# Patient Record
Sex: FEMALE | Race: BLACK OR AFRICAN AMERICAN
[De-identification: names, ages, dates, MRNs, and addresses within clinical notes are randomized per-mention and may not be internally consistent; named-entity substitution may affect disease eponyms.]

---

## 2016-10-26 VITALS — SYSTOLIC BLOOD PRESSURE: 133 MMHG | DIASTOLIC BLOOD PRESSURE: 63 MMHG

## 2017-10-09 ENCOUNTER — HOSPITAL ENCOUNTER (OUTPATIENT)
Dept: HOSPITAL 61 - MAMMO | Age: 77
Discharge: HOME | End: 2017-10-09
Attending: OBSTETRICS & GYNECOLOGY
Payer: MEDICARE

## 2017-10-09 DIAGNOSIS — Z12.31: Primary | ICD-10-CM

## 2017-10-09 NOTE — RAD
DATE: 10/9/2017



EXAM: DIGITAL SCREEN BILAT W/CAD



HISTORY: Screening



COMPARISON: One year earlier



This study was interpreted with the benefit of Computerized Aided Detection

(CAD).



FINDINGS:



Breast Density: FATTY  The Breast Parenchyma is primarily fatty replaced.

Breast parenchyma level density A.. 



 On the conventional images there is a density seen laterally in the right

breast. Additional images were obtained and no definite mass is seen although

the density does persist on the rolled medial image. It probably reflects

summation artifact.



Ultrasound was performed. The examination was targeted to the periareolar

position and the lateral aspect of the breast. A definite mass laterally in

the breast, corresponding to the density seen on the mammogram (again probably

reflecting summation artifact) s not seen. There is a triangular hypoechoic

area, nonvascular, at the 12:30 position of the breast 3 cm from the nipple

which is probably benign. Follow-up mammography of the right breast and

targeted ultrasound is suggested in 4 months to confirm stability 







IMPRESSION: Probable benign findings in the right breast as outlined above.

Follow-up mammography and targeted ultrasound in 3-4 months advised







BI-RADS CATEGORY: 3 PROBABLE BENIGN FINDING(S-SHORT INTERVAL FOLLOW-UP

SUGGESTED



RECOMMENDED FOLLOW-UP: 3M 3 MONTH FOLLOW-UP



PQRS compliance statement: Patient information was entered into a reminder

system with a target due date 2/9/2018 for the next mammogram.



Mammography is a sensitive method for finding small breast cancers, but it

does not detect them all and is not a substitute for careful clinical

examination.  A negative mammogram does not negate a clinically suspicious

finding and should not result in delay in biopsying a clinically suspicious

abnormality.



"Our facility is accredited by the American [employer] Mammography

Program."

## 2018-03-12 ENCOUNTER — HOSPITAL ENCOUNTER (EMERGENCY)
Dept: HOSPITAL 61 - ER | Age: 78
Discharge: HOME | End: 2018-03-12
Payer: MEDICARE

## 2018-03-12 VITALS
SYSTOLIC BLOOD PRESSURE: 170 MMHG | DIASTOLIC BLOOD PRESSURE: 76 MMHG | SYSTOLIC BLOOD PRESSURE: 170 MMHG | DIASTOLIC BLOOD PRESSURE: 76 MMHG

## 2018-03-12 DIAGNOSIS — I65.21: ICD-10-CM

## 2018-03-12 DIAGNOSIS — I25.10: ICD-10-CM

## 2018-03-12 DIAGNOSIS — Z88.8: ICD-10-CM

## 2018-03-12 DIAGNOSIS — Z90.49: ICD-10-CM

## 2018-03-12 DIAGNOSIS — E11.9: ICD-10-CM

## 2018-03-12 DIAGNOSIS — G45.0: ICD-10-CM

## 2018-03-12 DIAGNOSIS — E78.00: ICD-10-CM

## 2018-03-12 DIAGNOSIS — Z88.1: ICD-10-CM

## 2018-03-12 DIAGNOSIS — Z88.0: ICD-10-CM

## 2018-03-12 DIAGNOSIS — R42: Primary | ICD-10-CM

## 2018-03-12 DIAGNOSIS — I10: ICD-10-CM

## 2018-03-12 DIAGNOSIS — Z90.710: ICD-10-CM

## 2018-03-12 LAB
ADD MAN DIFF?: NO
ALBUMIN SERPL-MCNC: 3.6 G/DL (ref 3.4–5)
ALBUMIN/GLOB SERPL: 0.9 {RATIO} (ref 1–1.7)
ALP SERPL-CCNC: 53 U/L (ref 46–116)
ALT (SGPT): 27 U/L (ref 14–59)
ANION GAP SERPL CALC-SCNC: 10 MMOL/L (ref 6–14)
AST SERPL-CCNC: 27 U/L (ref 15–37)
BASO #: 0.1 X10^3/UL (ref 0–0.2)
BASO %: 1 % (ref 0–3)
BLOOD UREA NITROGEN: 29 MG/DL (ref 7–20)
BUN/CREAT SERPL: 26 (ref 6–20)
CALCIUM: 9.5 MG/DL (ref 8.5–10.1)
CHLORIDE: 104 MMOL/L (ref 98–107)
CO2 SERPL-SCNC: 28 MMOL/L (ref 21–32)
CREAT SERPL-MCNC: 1.1 MG/DL (ref 0.6–1)
EOS #: 0.2 X10^3/UL (ref 0–0.7)
EOS %: 2 % (ref 0–3)
GFR SERPLBLD BASED ON 1.73 SQ M-ARVRAT: 58.3 ML/MIN
GLOBULIN SER-MCNC: 3.9 G/DL (ref 2.2–3.8)
GLUCOSE SERPL-MCNC: 138 MG/DL (ref 70–99)
HCG SERPL-ACNC: 9.2 X10^3/UL (ref 4–11)
HEMATOCRIT: 37.4 % (ref 36–47)
HEMOGLOBIN: 12.5 G/DL (ref 12–15.5)
LYMPH #: 2.2 X10^3/UL (ref 1–4.8)
LYMPH %: 24 % (ref 24–48)
MEAN CORPUSCULAR HEMOGLOBIN: 28 PG (ref 25–35)
MEAN CORPUSCULAR HGB CONC: 33 G/DL (ref 31–37)
MEAN CORPUSCULAR VOLUME: 85 FL (ref 79–100)
MONO #: 0.6 X10^3/UL (ref 0–1.1)
MONO %: 7 % (ref 0–9)
NEUT #: 6.2 X10^3UL (ref 1.8–7.7)
NEUT %: 67 % (ref 31–73)
PLATELET COUNT: 398 X10^3/UL (ref 140–400)
POTASSIUM SERPL-SCNC: 3.8 MMOL/L (ref 3.5–5.1)
RED BLOOD COUNT: 4.42 X10^6/UL (ref 3.5–5.4)
RED CELL DISTRIBUTION WIDTH: 14.7 % (ref 11.5–14.5)
SODIUM: 142 MMOL/L (ref 136–145)
TOTAL BILIRUBIN: 0.3 MG/DL (ref 0.2–1)
TOTAL PROTEIN: 7.5 G/DL (ref 6.4–8.2)
TROPONINI: < 0.017 NG/ML (ref 0–0.06)

## 2018-03-12 PROCEDURE — 71045 X-RAY EXAM CHEST 1 VIEW: CPT

## 2018-03-12 PROCEDURE — 80053 COMPREHEN METABOLIC PANEL: CPT

## 2018-03-12 PROCEDURE — 70498 CT ANGIOGRAPHY NECK: CPT

## 2018-03-12 PROCEDURE — 96374 THER/PROPH/DIAG INJ IV PUSH: CPT

## 2018-03-12 PROCEDURE — 93005 ELECTROCARDIOGRAM TRACING: CPT

## 2018-03-12 PROCEDURE — 84484 ASSAY OF TROPONIN QUANT: CPT

## 2018-03-12 PROCEDURE — 36415 COLL VENOUS BLD VENIPUNCTURE: CPT

## 2018-03-12 PROCEDURE — 70496 CT ANGIOGRAPHY HEAD: CPT

## 2018-03-12 PROCEDURE — 85025 COMPLETE CBC W/AUTO DIFF WBC: CPT

## 2018-03-12 PROCEDURE — 99285 EMERGENCY DEPT VISIT HI MDM: CPT

## 2018-03-12 PROCEDURE — 96361 HYDRATE IV INFUSION ADD-ON: CPT

## 2018-03-12 RX ADMIN — BACITRACIN 1 MLS/HR: 5000 INJECTION, POWDER, FOR SOLUTION INTRAMUSCULAR at 12:01

## 2018-03-12 RX ADMIN — ONDANSETRON 1 MG: 2 INJECTION INTRAMUSCULAR; INTRAVENOUS at 12:01

## 2018-03-12 RX ADMIN — MECLIZINE 1 MG: 12.5 TABLET ORAL at 12:01

## 2018-03-12 RX ADMIN — IOHEXOL 1 ML: 300 INJECTION, SOLUTION INTRAVENOUS at 12:50

## 2018-03-21 ENCOUNTER — HOSPITAL ENCOUNTER (OUTPATIENT)
Dept: HOSPITAL 61 - ECHO | Age: 78
Discharge: HOME | End: 2018-03-21
Attending: INTERNAL MEDICINE
Payer: MEDICARE

## 2018-03-21 DIAGNOSIS — I51.7: ICD-10-CM

## 2018-03-21 DIAGNOSIS — I25.10: Primary | ICD-10-CM

## 2018-03-21 PROCEDURE — 93306 TTE W/DOPPLER COMPLETE: CPT

## 2018-10-10 ENCOUNTER — HOSPITAL ENCOUNTER (OUTPATIENT)
Dept: HOSPITAL 61 - MAMMO | Age: 78
Discharge: HOME | End: 2018-10-10
Attending: OBSTETRICS & GYNECOLOGY
Payer: MEDICARE

## 2018-10-10 DIAGNOSIS — Z12.31: Primary | ICD-10-CM

## 2018-10-10 PROCEDURE — 77067 SCR MAMMO BI INCL CAD: CPT

## 2018-10-10 PROCEDURE — 77063 BREAST TOMOSYNTHESIS BI: CPT

## 2018-10-10 NOTE — RAD
DATE: 10/10/2018



EXAM: MAMMO RACHEL SCREENING BILATERAL



HISTORY: Routine screening



COMPARISON: 10/9/2017



This study was interpreted with the benefit of Computerized Aided Detection

(CAD).





Breast Density: FATTY The breast parenchyma is primarily fatty replaced.

Breast parenchyma level density A.





FINDINGS: 2-D and 3-D tomosynthesis imaging was performed in CC and MLO

projections.  Several tiny smooth nodules are present in both breasts.  These

are better demonstrated on the current 3-D views compared to the previous 2-D

studies.  Multiple smooth nodule such as this tend to be benign.  No

spiculated mass or architectural distortion is evident.  Benign type

calcifications are present.  No suspicious microcalcifications have developed.





IMPRESSION: There is no mammographic evidence of malignancy in either breast.







BI-RADS CATEGORY: 2 BENIGN FINDING(S)



RECOMMENDED FOLLOW-UP: 12M 12 MONTH FOLLOW-UP



PQRS compliance statement: Patient information was entered into a reminder

system with a target due date     for the next mammogram.



Mammography is a sensitive method for finding small breast cancers, but it

does not detect them all and is not a substitute for careful clinical

examination.  A negative mammogram does not negate a clinically suspicious

finding and should not result in delay in biopsying a clinically suspicious

abnormality.



"Our facility is accredited by the American [employer] Mammography

Program."

## 2018-11-30 ENCOUNTER — HOSPITAL ENCOUNTER (OUTPATIENT)
Dept: HOSPITAL 61 - NM | Age: 78
Discharge: HOME | End: 2018-11-30
Attending: INTERNAL MEDICINE
Payer: MEDICARE

## 2018-11-30 DIAGNOSIS — R00.2: ICD-10-CM

## 2018-11-30 DIAGNOSIS — I10: ICD-10-CM

## 2018-11-30 DIAGNOSIS — E11.9: ICD-10-CM

## 2018-11-30 DIAGNOSIS — I25.10: Primary | ICD-10-CM

## 2018-11-30 DIAGNOSIS — E66.9: ICD-10-CM

## 2018-11-30 DIAGNOSIS — R94.31: ICD-10-CM

## 2018-11-30 PROCEDURE — 96375 TX/PRO/DX INJ NEW DRUG ADDON: CPT

## 2018-11-30 PROCEDURE — 96374 THER/PROPH/DIAG INJ IV PUSH: CPT

## 2018-11-30 PROCEDURE — 96376 TX/PRO/DX INJ SAME DRUG ADON: CPT

## 2018-11-30 PROCEDURE — A9500 TC99M SESTAMIBI: HCPCS

## 2018-11-30 PROCEDURE — 93017 CV STRESS TEST TRACING ONLY: CPT

## 2018-11-30 PROCEDURE — 78452 HT MUSCLE IMAGE SPECT MULT: CPT

## 2018-11-30 NOTE — RAD
MR#: [id number]

Account#: [id number]

Accession#: [id number]

Date of Study: 11/30/2018

Ordering Physician: ROSE WESTON, 

Referring Physician: SURAJ TORRES Tech: BHAVYA Frazier, ARRT (R) (N)





--------------- APPROVED REPORT --------------





Test Type:          Pharmacological

Stress Nurse/Tech: Alcira Gabriel R.N.

Test Indications: CAD, palpatations, SOB

Cardiac History: obese, htn, heart cath- no stents, DM

Medications:     See Electronic Medical Record

Medical History: See Electronic Medical Record

Resting Heart Rate: 76 bpm

Resting Blood Pressure: 137/84mmHg

Pretest Chest Pain: No chest pain



Nurse/Tech Notes

S1S2, lungs CTA

Consent: The procedure was explained to the patient in lay terms. Informed consent was witnessed. Saji
eout was entered into [employer]. History and Stress Test performed by Andria Forrest,  (R) (N)



Pharm. Details

Pharmacologic stress testing was performed using 0.4mg per 5ml of regadenoson given intravenously ove
r 7-10 seconds.



Stress Symptoms

slight SOB, a very weird feeling, severe h/a at first then deminished to just minimally noticed. Pt's
 b/p became very elevated post lexiscan and stayed elevated through the end of recovery period. had p
t take her home b/p meds( takes 3) and will recheck b/p when she comes back for after stress images. 
Notified Noreen CANNON of these issues. will update him after next VS taken. 1245 b/p rechecked 1 hour a
fter she took her home b/p medications.b/p is now 187/73. notified Dr. Weston



POST EXERCISE

Reason for Termination: Infusion complete

Max HR: 108 bpm

Max Blood Pressure: 219/78mmHg

Blood Pressure response to exercise: Abnormal increase in blood pressure during stress.

Heart Rate response to exercise: wnl

Chest Pain: No. 

Arrhythmia: No. 



INTERPRETATION

Stress EKG Conclusion: The resting EKG shows a sinus rhythm and diffuse nonspecific ST-T wave changes
.

The stress EKG showed further nonspecific ST-T wave changes.

Baseline abnormal EKG with nonspecific ST-T wave changes, these changes became more prominent with st
ress. Suggestive but not diagnostic of ischemia.



Imaging Protocol

IMAGE PROTOCOL: Rest Tc-99m/stress Tc-99m 1 day



Rest:            Stress:         Viability:   

Radiopharm.Tc99m [id number] Sestamibi

Dose11.1mCi            35.2mCi            

Img Date  11/30/2018 11/30/2018      

Inj-Img [id number].           60min.           



Rest Admin Site:IV - Left AntecubitalAdministrator:RT Marcia (R)(N)

Stress Admin Site: IV - Left AntecubitalAdministrator: RT Marcia (R)(N)



STRESS DATA

End Diast. Vol.60.0mlLVEDV index BSA32.0ml

End Syst. Vol.10.0mlLVESV index BSA5.0ml

Myocardial Kghm850.0gEject. Vfgevjdd59.0%



Stress Scores

Regional WT1.00Summed WT7.00

Regional WM0.00Summed WM0.00



LV Perfusion

The stress scans show inferior wall thinning.

The rest scans show inferior wall thinning.

Nuclear imaging shows no reversible ischemia.

Nuclear imaging shows fixed mild inferior wall thinning which in the setting of normal LV systolic fu
nction is consistent with an attenuation defect.



Wall Motion

Left ventricular systolic function is normal with an ejection fraction of greater than 70%.



LV Perf. Quant

17 Seg. SSS4.00

17 Seg. SRS6.00

17 Seg. SDS0.00

Stress Defect Extent (% LAD)1.30Rest Defect Extent (% LAD)0.00Rev. Defect Extent (% LAD)0.00

Stress Defect Extent (% LCX) 0.00Rest Defect Extent (% LCX)1.30Rev. Defect Extent (% LCX)0.00

Stress Defect Extent (% RCA)0.00Rest Defect Extent (% RCA)24.40Rev. Defect Extent (% RCA)0.00

Stress Defect Extent (% BE)1.30Rest Defect Extent (% BE)7.80Rev. Defect Extent (% BE)0.00



Conclusion

1. Abnormal baseline EKG with nonspecific ST-T wave changes which become more prominent with stress. 
This is suggestive but not diagnostic of ischemia.

2. Nuclear imaging shows no reversible ischemia.

3. Nuclear imaging shows fixed mild inferior wall thinning which is consistent with an attenuation de
fect.

4. Normal left ventricular systolic function with no regional wall motion abnormalities and an ejecti
on fraction of greater than 70%.

5. Moderately low risk Lexiscan nuclear stress test with no reversible ischemia and normal LV systoli
c function.



Signed by : Pradeep Diaz MD

Electronically Approved : 11/30/2018 12:58:06

## 2019-02-25 ENCOUNTER — HOSPITAL ENCOUNTER (OUTPATIENT)
Dept: HOSPITAL 35 - CATH | Age: 79
Setting detail: OBSERVATION
LOS: 1 days | Discharge: HOME | End: 2019-02-26
Attending: INTERNAL MEDICINE | Admitting: INTERNAL MEDICINE
Payer: COMMERCIAL

## 2019-02-25 VITALS — DIASTOLIC BLOOD PRESSURE: 80 MMHG | SYSTOLIC BLOOD PRESSURE: 201 MMHG

## 2019-02-25 VITALS — SYSTOLIC BLOOD PRESSURE: 184 MMHG | DIASTOLIC BLOOD PRESSURE: 55 MMHG

## 2019-02-25 VITALS — BODY MASS INDEX: 32.9 KG/M2 | WEIGHT: 185.7 LBS | HEIGHT: 63 IN

## 2019-02-25 DIAGNOSIS — I25.10: ICD-10-CM

## 2019-02-25 DIAGNOSIS — Z88.0: ICD-10-CM

## 2019-02-25 DIAGNOSIS — E11.51: ICD-10-CM

## 2019-02-25 DIAGNOSIS — I47.1: Primary | ICD-10-CM

## 2019-02-25 DIAGNOSIS — I10: ICD-10-CM

## 2019-02-25 DIAGNOSIS — E78.5: ICD-10-CM

## 2019-02-25 LAB
ALBUMIN SERPL-MCNC: 4.1 G/DL (ref 3.4–5)
ALT SERPL-CCNC: 24 U/L (ref 30–65)
ANION GAP SERPL CALC-SCNC: 11 MMOL/L (ref 7–16)
APTT BLD: 25.4 SECONDS (ref 24.5–32.8)
AST SERPL-CCNC: 24 U/L (ref 15–37)
BASOPHILS NFR BLD AUTO: 0.9 % (ref 0–2)
BILIRUB SERPL-MCNC: 0.4 MG/DL
BUN SERPL-MCNC: 28 MG/DL (ref 7–18)
CALCIUM SERPL-MCNC: 10.3 MG/DL (ref 8.5–10.1)
CHLORIDE SERPL-SCNC: 102 MMOL/L (ref 98–107)
CO2 SERPL-SCNC: 31 MMOL/L (ref 21–32)
CREAT SERPL-MCNC: 1.1 MG/DL (ref 0.6–1)
EOSINOPHIL NFR BLD: 1.7 % (ref 0–3)
ERYTHROCYTE [DISTWIDTH] IN BLOOD BY AUTOMATED COUNT: 15.5 % (ref 10.5–14.5)
GLUCOSE SERPL-MCNC: 153 MG/DL (ref 74–106)
GRANULOCYTES NFR BLD MANUAL: 61.3 % (ref 36–66)
HCT VFR BLD CALC: 41.4 % (ref 37–47)
HGB BLD-MCNC: 13.4 GM/DL (ref 12–15)
INR PPP: 1
LYMPHOCYTES NFR BLD AUTO: 28.4 % (ref 24–44)
MCH RBC QN AUTO: 27 PG (ref 26–34)
MCHC RBC AUTO-ENTMCNC: 32.4 G/DL (ref 28–37)
MCV RBC: 83.4 FL (ref 80–100)
MONOCYTES NFR BLD: 7.7 % (ref 1–8)
NEUTROPHILS # BLD: 6.5 THOU/UL (ref 1.4–8.2)
PLATELET # BLD: 389 THOU/UL (ref 150–400)
POTASSIUM SERPL-SCNC: 3.1 MMOL/L (ref 3.5–5.1)
PROT SERPL-MCNC: 8.4 G/DL (ref 6.4–8.2)
PROTHROMBIN TIME: 10.1 SECONDS (ref 9.3–11.4)
RBC # BLD AUTO: 4.96 MIL/UL (ref 4.2–5)
SODIUM SERPL-SCNC: 144 MMOL/L (ref 136–145)
WBC # BLD AUTO: 10.6 THOU/UL (ref 4–11)

## 2019-02-25 PROCEDURE — 70005: CPT

## 2019-02-25 PROCEDURE — 62110: CPT

## 2019-02-25 PROCEDURE — 62900: CPT

## 2019-02-25 NOTE — NUR
PT ADMITTED FROM CATH LAB AT APPROX 1545. PT ORIENTED TO ROOM AND UNIT. PT A/O
X 4. DENIES PAIN. VSS. SR ON TELE. ADMISSION ASSESSMENT COMPLETED.

## 2019-02-26 ENCOUNTER — HOSPITAL ENCOUNTER (INPATIENT)
Dept: HOSPITAL 61 - ER | Age: 79
LOS: 3 days | Discharge: HOME | DRG: 305 | End: 2019-03-01
Attending: FAMILY MEDICINE | Admitting: FAMILY MEDICINE
Payer: MEDICARE

## 2019-02-26 VITALS — SYSTOLIC BLOOD PRESSURE: 161 MMHG | DIASTOLIC BLOOD PRESSURE: 70 MMHG

## 2019-02-26 VITALS — BODY MASS INDEX: 33.69 KG/M2 | WEIGHT: 190.13 LBS | HEIGHT: 63 IN

## 2019-02-26 VITALS — DIASTOLIC BLOOD PRESSURE: 82 MMHG | SYSTOLIC BLOOD PRESSURE: 145 MMHG

## 2019-02-26 VITALS — DIASTOLIC BLOOD PRESSURE: 82 MMHG | SYSTOLIC BLOOD PRESSURE: 151 MMHG

## 2019-02-26 DIAGNOSIS — B37.9: ICD-10-CM

## 2019-02-26 DIAGNOSIS — Z90.710: ICD-10-CM

## 2019-02-26 DIAGNOSIS — T78.3XXA: ICD-10-CM

## 2019-02-26 DIAGNOSIS — E78.5: ICD-10-CM

## 2019-02-26 DIAGNOSIS — I25.10: ICD-10-CM

## 2019-02-26 DIAGNOSIS — M19.90: ICD-10-CM

## 2019-02-26 DIAGNOSIS — Z88.0: ICD-10-CM

## 2019-02-26 DIAGNOSIS — E11.22: ICD-10-CM

## 2019-02-26 DIAGNOSIS — I16.0: Primary | ICD-10-CM

## 2019-02-26 DIAGNOSIS — J45.909: ICD-10-CM

## 2019-02-26 DIAGNOSIS — Z90.49: ICD-10-CM

## 2019-02-26 DIAGNOSIS — I15.8: ICD-10-CM

## 2019-02-26 DIAGNOSIS — Z88.1: ICD-10-CM

## 2019-02-26 DIAGNOSIS — E78.00: ICD-10-CM

## 2019-02-26 DIAGNOSIS — N18.9: ICD-10-CM

## 2019-02-26 DIAGNOSIS — Z82.49: ICD-10-CM

## 2019-02-26 DIAGNOSIS — T46.4X5A: ICD-10-CM

## 2019-02-26 DIAGNOSIS — Z88.8: ICD-10-CM

## 2019-02-26 LAB
ALBUMIN SERPL-MCNC: 3.5 G/DL (ref 3.4–5)
ALBUMIN/GLOB SERPL: 0.9 {RATIO} (ref 1–1.7)
ALP SERPL-CCNC: 51 U/L (ref 46–116)
ALT SERPL-CCNC: 20 U/L (ref 14–59)
ANION GAP SERPL CALC-SCNC: 9 MMOL/L (ref 6–14)
AST SERPL-CCNC: 27 U/L (ref 15–37)
BASOPHILS # BLD AUTO: 0 X10^3/UL (ref 0–0.2)
BASOPHILS NFR BLD: 0 % (ref 0–3)
BILIRUB SERPL-MCNC: 0.4 MG/DL (ref 0.2–1)
BUN SERPL-MCNC: 24 MG/DL (ref 7–20)
BUN/CREAT SERPL: 24 (ref 6–20)
CALCIUM SERPL-MCNC: 9.3 MG/DL (ref 8.5–10.1)
CHLORIDE SERPL-SCNC: 102 MMOL/L (ref 98–107)
CO2 SERPL-SCNC: 32 MMOL/L (ref 21–32)
CREAT SERPL-MCNC: 1 MG/DL (ref 0.6–1)
EOSINOPHIL NFR BLD: 0.2 X10^3/UL (ref 0–0.7)
EOSINOPHIL NFR BLD: 2 % (ref 0–3)
ERYTHROCYTE [DISTWIDTH] IN BLOOD BY AUTOMATED COUNT: 15.3 % (ref 11.5–14.5)
GFR SERPLBLD BASED ON 1.73 SQ M-ARVRAT: 64.9 ML/MIN
GLOBULIN SER-MCNC: 4 G/DL (ref 2.2–3.8)
GLUCOSE SERPL-MCNC: 118 MG/DL (ref 70–99)
HCT VFR BLD CALC: 39 % (ref 36–47)
HGB BLD-MCNC: 12.7 G/DL (ref 12–15.5)
LYMPHOCYTES # BLD: 2.9 X10^3/UL (ref 1–4.8)
LYMPHOCYTES NFR BLD AUTO: 26 % (ref 24–48)
MCH RBC QN AUTO: 27 PG (ref 25–35)
MCHC RBC AUTO-ENTMCNC: 33 G/DL (ref 31–37)
MCV RBC AUTO: 83 FL (ref 79–100)
MONO #: 0.9 X10^3/UL (ref 0–1.1)
MONOCYTES NFR BLD: 8 % (ref 0–9)
NEUT #: 7 X10^3UL (ref 1.8–7.7)
NEUTROPHILS NFR BLD AUTO: 63 % (ref 31–73)
PLATELET # BLD AUTO: 327 X10^3/UL (ref 140–400)
POTASSIUM SERPL-SCNC: 3.4 MMOL/L (ref 3.5–5.1)
PROT SERPL-MCNC: 7.5 G/DL (ref 6.4–8.2)
RBC # BLD AUTO: 4.68 X10^6/UL (ref 3.5–5.4)
SODIUM SERPL-SCNC: 143 MMOL/L (ref 136–145)
WBC # BLD AUTO: 11.1 X10^3/UL (ref 4–11)

## 2019-02-26 PROCEDURE — 96361 HYDRATE IV INFUSION ADD-ON: CPT

## 2019-02-26 PROCEDURE — 96375 TX/PRO/DX INJ NEW DRUG ADDON: CPT

## 2019-02-26 PROCEDURE — 36415 COLL VENOUS BLD VENIPUNCTURE: CPT

## 2019-02-26 PROCEDURE — 85651 RBC SED RATE NONAUTOMATED: CPT

## 2019-02-26 PROCEDURE — 87070 CULTURE OTHR SPECIMN AEROBIC: CPT

## 2019-02-26 PROCEDURE — 82962 GLUCOSE BLOOD TEST: CPT

## 2019-02-26 PROCEDURE — 80053 COMPREHEN METABOLIC PANEL: CPT

## 2019-02-26 PROCEDURE — 85007 BL SMEAR W/DIFF WBC COUNT: CPT

## 2019-02-26 PROCEDURE — 87880 STREP A ASSAY W/OPTIC: CPT

## 2019-02-26 PROCEDURE — 96374 THER/PROPH/DIAG INJ IV PUSH: CPT

## 2019-02-26 PROCEDURE — 80048 BASIC METABOLIC PNL TOTAL CA: CPT

## 2019-02-26 PROCEDURE — 85025 COMPLETE CBC W/AUTO DIFF WBC: CPT

## 2019-02-26 NOTE — NUR
Pt post cardiac ablation.  ao x4.  Groin sites dressing (L & R ) both clean
dry and intact, with minimal dry blood stay on the left that was there post
procedure.  pt BP  controlled better compared previous + .  pt denies
nausea, chest pain, SR on the monitor.  anticipate to DC today.

## 2019-02-26 NOTE — PHYS DOC
Past Medical History


Past Medical History:  Diabetes-Type II, High Cholesterol, Hypertension


Additional Past Medical Histor:  LOW POTASSIUM,"ventricular tachycardia" w 

ablation


Past Surgical History:  Appendectomy, Cholecystectomy, Hysterectomy


Additional Past Surgical Histo:  BACK SURGERY, CARDIAC CATH, vascular surgery 

of the aorta


Alcohol Use:  None


Drug Use:  None





Adult General


Chief Complaint


Chief Complaint:  TONGUE SWELLING/INJURY





[facility]


HPI





Patient is a 78-year-old female who presents with complaint of tongue pain, 

swelling and pain and swelling around her gums that started today. Patient had 

been at [facility] and had cardiac ablation. She states that she has 

had a history of tongue swelling in the past associated with lisinopril. She 

also indicates that she noticed that her tongue is white. She denies any fever, 

nausea or vomiting. She rates pain as being moderate. She also complains of 

sore throat.





Review of Systems


Review of Systems





Constitutional: Denies fever or chills []


HENT: Complains of tongue swelling, pain and white plaque-like coating[]


Respiratory: Denies cough or shortness of breath []


Cardiovascular: No additional information not addressed in HPI []


GI: Denies abdominal pain, nausea, vomiting or diarrhea []


Integument: Denies rash or skin lesions []


Neurologic: Denies headache, focal weakness or sensory changes []





A full 10 point review of systems has been reviewed and is otherwise negative.





Current Medications


Current Medications





Current Medications








 Medications


  (Trade)  Dose


 Ordered  Sig/[facility]  Start Time


 Stop Time Status Last Admin


Dose Admin


 


 Clonidine HCl


  (Catapres)  0.2 mg  1X  ONCE  2/26/19 22:15


 2/26/19 22:16 DC 2/26/19 22:28


0.2 MG


 


 Diphenhydramine


 HCl


  (Benadryl)  25 mg  1X  ONCE  2/26/19 20:45


 2/26/19 20:46 DC 2/26/19 21:23


25 MG


 


 Famotidine


  (Pepcid Vial)  20 mg  1X  ONCE  2/26/19 20:45


 2/26/19 20:46 DC 2/26/19 21:23


20 MG


 


 Methylprednisolone


 Sodium Succinate


  (SOLU-Medrol


 125MG VIAL)  125 mg  1X  ONCE  2/26/19 20:45


 2/26/19 20:46 DC 2/26/19 21:23


125 MG


 


 Sodium Chloride  1,000 ml @ 


 100 mls/hr  Q10H  2/26/19 20:32


 2/27/19 06:31  2/26/19 21:22


100 MLS/HR











Allergies


Allergies





Allergies








Coded Allergies Type Severity Reaction Last Updated Verified


 


  lisinopril Allergy Severe Swelling 2/2/16 Yes


 


  Penicillins Allergy Intermediate Hives 2/2/16 Yes


 


  azithromycin Allergy Intermediate Nausea and Vomiting 2/2/16 Yes


 


  cefaclor Allergy Intermediate Nausea and Vomiting 2/3/16 Yes











Physical Exam


Physical Exam





Constitutional: Well developed, well nourished, no acute distress, non-toxic 

appearance. []


HENT: Normocephalic, atraumatic, bilateral external ears normal, oropharynx 

moist, tongue with what appears to be white plaque-like covering in mild 

swelling, nose normal. []


Eyes: PERRLA, EOMI, conjunctiva normal, no discharge. [] 


Neck: Normal range of motion, no tenderness, supple. [] 


Cardiovascular: Regular rate and rhythm[]


Lungs & Thorax:  Bilateral breath sounds clear to auscultation []


Abdomen: Bowel sounds normal, soft, no tenderness. [] 


Skin: Warm, dry, no erythema, no rash. [] 


Extremities: No tenderness, no cyanosis, no clubbing, ROM intact. [] 


Neurologic: Alert and oriented X 3, normal motor function, normal sensory 

function, no focal deficits noted. []





Current Patient Data


Vital Signs





 Vital Signs








  Date Time  Temp Pulse Resp B/P (MAP) Pulse Ox O2 Delivery O2 Flow Rate FiO2


 


2/26/19 22:28  91  245/106    


 


2/26/19 20:52   20  96 Room Air  


 


2/26/19 20:22 97.8       





 97.8       








Lab Values





 Laboratory Tests








Test


 2/26/19


21:25


 


White Blood Count


 11.1 x10^3/uL


(4.0-11.0)  H


 


Red Blood Count


 4.68 x10^6/uL


(3.50-5.40)


 


Hemoglobin


 12.7 g/dL


(12.0-15.5)


 


Hematocrit


 39.0 %


(36.0-47.0)


 


Mean Corpuscular Volume


 83 fL ()





 


Mean Corpuscular Hemoglobin 27 pg (25-35)  


 


Mean Corpuscular Hemoglobin


Concent 33 g/dL


(31-37)


 


Red Cell Distribution Width


 15.3 %


(11.5-14.5)  H


 


Platelet Count


 327 x10^3/uL


(140-400)


 


Neutrophils (%) (Auto) 63 % (31-73)  


 


Lymphocytes (%) (Auto) 26 % (24-48)  


 


Monocytes (%) (Auto) 8 % (0-9)  


 


Eosinophils (%) (Auto) 2 % (0-3)  


 


Basophils (%) (Auto) 0 % (0-3)  


 


Neutrophils # (Auto)


 7.0 x10^3uL


(1.8-7.7)


 


Lymphocytes # (Auto)


 2.9 x10^3/uL


(1.0-4.8)


 


Monocytes # (Auto)


 0.9 x10^3/uL


(0.0-1.1)


 


Eosinophils # (Auto)


 0.2 x10^3/uL


(0.0-0.7)


 


Basophils # (Auto)


 0.0 x10^3/uL


(0.0-0.2)


 


Sodium Level


 143 mmol/L


(136-145)


 


Potassium Level


 3.4 mmol/L


(3.5-5.1)  L


 


Chloride Level


 102 mmol/L


()


 


Carbon Dioxide Level


 32 mmol/L


(21-32)


 


Anion Gap 9 (6-14)  


 


Blood Urea Nitrogen


 24 mg/dL


(7-20)  H


 


Creatinine


 1.0 mg/dL


(0.6-1.0)


 


Estimated GFR


(Cockcroft-Gault) 64.9  





 


BUN/Creatinine Ratio 24 (6-20)  H


 


Glucose Level


 118 mg/dL


(70-99)  H


 


Calcium Level


 9.3 mg/dL


(8.5-10.1)


 


Total Bilirubin


 0.4 mg/dL


(0.2-1.0)


 


Aspartate Amino Transferase


(AST) 27 U/L (15-37)





 


Alanine Aminotransferase (ALT)


 20 U/L (14-59)





 


Alkaline Phosphatase


 51 U/L


()


 


Total Protein


 7.5 g/dL


(6.4-8.2)


 


Albumin


 3.5 g/dL


(3.4-5.0)


 


Albumin/Globulin Ratio


 0.9 (1.0-1.7)


L





 Laboratory Tests


2/26/19 21:25








 Laboratory Tests


2/26/19 21:25











EKG


EKG


[]





Radiology/Procedures


Radiology/Procedures


[]





Course & Med Decision Making


Course & Med Decision Making


Pertinent Labs and Imaging studies reviewed. (See chart for details)





[]





Dragon Disclaimer


Dragon Disclaimer


This electronic medical record was generated, in whole or in part, using a 

voice recognition dictation system.





Departure


Departure


Impression:  


 Primary Impression:  


 Angio-edema


 Additional Impression:  


 Hypertensive urgency


Disposition:  09 ADMITTED AS INPATIENT


Admitting Physician:  Michoacano Verma


Condition:  IMPROVED


Referrals:  


ASHIA RAMÍREZ MD (PCP)





Problem Qualifiers








 Primary Impression:  


 Angio-edema


 Encounter type:  initial encounter  Qualified Codes:  T78.3XXA - Angioneurotic 

edema, initial encounter








MIGDALIA LYNN Jr. DO Feb 26, 2019 21:08

## 2019-02-27 VITALS — SYSTOLIC BLOOD PRESSURE: 181 MMHG | DIASTOLIC BLOOD PRESSURE: 74 MMHG

## 2019-02-27 VITALS — SYSTOLIC BLOOD PRESSURE: 184 MMHG | DIASTOLIC BLOOD PRESSURE: 78 MMHG

## 2019-02-27 VITALS — SYSTOLIC BLOOD PRESSURE: 164 MMHG | DIASTOLIC BLOOD PRESSURE: 83 MMHG

## 2019-02-27 VITALS — SYSTOLIC BLOOD PRESSURE: 174 MMHG | DIASTOLIC BLOOD PRESSURE: 74 MMHG

## 2019-02-27 VITALS — SYSTOLIC BLOOD PRESSURE: 205 MMHG | DIASTOLIC BLOOD PRESSURE: 101 MMHG

## 2019-02-27 VITALS — SYSTOLIC BLOOD PRESSURE: 147 MMHG | DIASTOLIC BLOOD PRESSURE: 77 MMHG

## 2019-02-27 VITALS — DIASTOLIC BLOOD PRESSURE: 72 MMHG | SYSTOLIC BLOOD PRESSURE: 120 MMHG

## 2019-02-27 VITALS — DIASTOLIC BLOOD PRESSURE: 77 MMHG | SYSTOLIC BLOOD PRESSURE: 147 MMHG

## 2019-02-27 VITALS — DIASTOLIC BLOOD PRESSURE: 82 MMHG | SYSTOLIC BLOOD PRESSURE: 188 MMHG

## 2019-02-27 LAB
% LYMPHS: 13 % (ref 24–48)
% MONOS: 1 % (ref 0–10)
% SEGS: 86 % (ref 35–66)
ANION GAP SERPL CALC-SCNC: 11 MMOL/L (ref 6–14)
BASOPHILS # BLD AUTO: 0 X10^3/UL (ref 0–0.2)
BASOPHILS NFR BLD: 0 % (ref 0–3)
BUN SERPL-MCNC: 24 MG/DL (ref 7–20)
CALCIUM SERPL-MCNC: 9.4 MG/DL (ref 8.5–10.1)
CHLORIDE SERPL-SCNC: 104 MMOL/L (ref 98–107)
CO2 SERPL-SCNC: 28 MMOL/L (ref 21–32)
CREAT SERPL-MCNC: 1.2 MG/DL (ref 0.6–1)
EOSINOPHIL NFR BLD: 0 % (ref 0–3)
EOSINOPHIL NFR BLD: 0 X10^3/UL (ref 0–0.7)
ERYTHROCYTE [DISTWIDTH] IN BLOOD BY AUTOMATED COUNT: 15.6 % (ref 11.5–14.5)
GFR SERPLBLD BASED ON 1.73 SQ M-ARVRAT: 52.6 ML/MIN
GLUCOSE SERPL-MCNC: 198 MG/DL (ref 70–99)
HCT VFR BLD CALC: 36.5 % (ref 36–47)
HGB BLD-MCNC: 12 G/DL (ref 12–15.5)
LYMPHOCYTES # BLD: 1.2 X10^3/UL (ref 1–4.8)
LYMPHOCYTES NFR BLD AUTO: 12 % (ref 24–48)
MCH RBC QN AUTO: 27 PG (ref 25–35)
MCHC RBC AUTO-ENTMCNC: 33 G/DL (ref 31–37)
MCV RBC AUTO: 83 FL (ref 79–100)
MONO #: 0.1 X10^3/UL (ref 0–1.1)
MONOCYTES NFR BLD: 1 % (ref 0–9)
NEUT #: 8.7 X10^3UL (ref 1.8–7.7)
NEUTROPHILS NFR BLD AUTO: 87 % (ref 31–73)
PLATELET # BLD AUTO: 315 X10^3/UL (ref 140–400)
PLATELET # BLD EST: ADEQUATE 10*3/UL
POTASSIUM SERPL-SCNC: 3.6 MMOL/L (ref 3.5–5.1)
RBC # BLD AUTO: 4.37 X10^6/UL (ref 3.5–5.4)
SODIUM SERPL-SCNC: 143 MMOL/L (ref 136–145)
WBC # BLD AUTO: 10 X10^3/UL (ref 4–11)

## 2019-02-27 RX ADMIN — RANOLAZINE SCH MG: 500 TABLET, FILM COATED, EXTENDED RELEASE ORAL at 08:10

## 2019-02-27 RX ADMIN — CHLORTHALIDONE SCH MG: 25 TABLET ORAL at 12:39

## 2019-02-27 RX ADMIN — ASPIRIN SCH MG: 81 TABLET, COATED ORAL at 08:07

## 2019-02-27 RX ADMIN — METOPROLOL TARTRATE SCH MG: 25 TABLET ORAL at 21:14

## 2019-02-27 RX ADMIN — METOPROLOL TARTRATE SCH MG: 25 TABLET ORAL at 08:10

## 2019-02-27 RX ADMIN — ISOSORBIDE MONONITRATE SCH MG: 30 TABLET, EXTENDED RELEASE ORAL at 08:06

## 2019-02-27 RX ADMIN — FENOFIBRATE SCH MG: 54 TABLET ORAL at 08:07

## 2019-02-27 RX ADMIN — POTASSIUM CHLORIDE SCH MEQ: 750 TABLET, FILM COATED, EXTENDED RELEASE ORAL at 17:00

## 2019-02-27 RX ADMIN — FLUCONAZOLE SCH MG: 100 TABLET ORAL at 12:39

## 2019-02-27 RX ADMIN — CLOPIDOGREL BISULFATE SCH MG: 75 TABLET ORAL at 08:10

## 2019-02-27 RX ADMIN — POTASSIUM CHLORIDE SCH MEQ: 750 TABLET, FILM COATED, EXTENDED RELEASE ORAL at 08:07

## 2019-02-27 RX ADMIN — ATORVASTATIN CALCIUM SCH MG: 20 TABLET, FILM COATED ORAL at 21:13

## 2019-02-27 RX ADMIN — RANOLAZINE SCH MG: 500 TABLET, FILM COATED, EXTENDED RELEASE ORAL at 21:15

## 2019-02-27 NOTE — PDOC1
History and Physical


Date of Admission


Date of Admission


2/27/2019





Identification/Chief Complaint


Chief Complaint


my tongue was swollen


Problems:  


(1) Angio-edema


(2) Hypertensive urgency





Source


Source:  Chart review, Patient





History of Present Illness


History of Present Illness


Patient is a 78 year old female who was in her susual state of heatlh until the 

evening of her admission when she presented tongue "swelling" and also "white 

stuff" all along her tongue and was having trouble with odynophgia with the 

events. Patient had been told in the past that she should stay away from Ace 

inhibitors including lisinopril but she has been taking LOSARTAN WITH HCTZ IN 

THE OUTPATIENT SETTING. SHE SAYS THAT SHE HAS BEEN ON HER SAME MEDICATIONS for 

quite some time now. Patient at the time of my evaluation is in no apparent 

distress, she was admitted overnight for concerns of angioedema and also 

uncontrolled hypertension lSHe is not complaining of chest pain at the time of 

my evaluation no palpitations no headache blurred vision or neurolgoical 

deficits evident. 





Paln of care discussed ith the patient and family at bedside. 





er course:


Patient is a 78-year-old female who presents with complaint of tongue pain, 

swelling and pain and swelling around her gums that started today. Patient had 

been at [facility] and had cardiac ablation. She states that she has 

had a history of tongue swelling in the past associated with lisinopril. She 

also indicates that she noticed that her tongue is white. She denies any fever, 

nausea or vomiting. She rates pain as being moderate. She also complains of 

sore throat.





Past Medical History


Cardiovascular:  HTN, Hyperlipidemia, Other


CENTRAL NERVOUS SYSTEM:  Other


Renal/:  Chronic renal insuff


Endocrine:  Diabetes





Past Surgical History


Past Surgical History:  Cholecystectomy, Hysterectomy, Other





Family History


Family History:  No Significant, Hypertension





Social History


ALCOHOL:  none


Drugs:  None





Current Problem List


Problem List


Problems


Medical Problems:


(1) Angio-edema


Status: Acute  





(2) Hypertensive urgency


Status: Acute  











Current Medications


Current Medications





Current Medications








 Medications


  (Trade)  Dose


 Ordered  Sig/Nessa  Start Time


 Stop Time Status Last Admin


Dose Admin


 


 Amlodipine


 Besylate


  (Norvasc)  10 mg  DAILY  2/27/19 09:00


    2/27/19 08:07


10 MG


 


 Aspirin


  (Ecotrin)  81 mg  DAILY  2/27/19 09:00


    2/27/19 08:07


81 MG


 


 Atorvastatin


 Calcium


  (Lipitor)  20 mg  QHS  2/27/19 21:00


     





 


 Chlorthalidone


  (Thalitone)  25 mg  DAILY  2/27/19 11:00


    2/27/19 12:39


25 MG


 


 Clonidine HCl


  (Catapres)  0.1 mg  DAILY  2/27/19 09:00


    2/27/19 08:08


0.1 MG


 


 Clopidogrel


 Bisulfate


  (Plavix)  75 mg  DAILY  2/27/19 09:00


    2/27/19 08:10


75 MG


 


 Diphenhydramine


 HCl


  (Benadryl)  25 mg  1X  ONCE  2/26/19 20:45


 2/26/19 20:46 DC 2/26/19 21:23


25 MG


 


 Famotidine


  (Pepcid Vial)  20 mg  1X  ONCE  2/26/19 20:45


 2/26/19 20:46 DC 2/26/19 21:23


20 MG


 


 Fenofibrate


  (Lofibra)  54 mg  DAILY  2/27/19 09:00


    2/27/19 08:07


54 MG


 


 Fluconazole


  (Diflucan)  100 mg  DAILY  2/27/19 10:45


    2/27/19 12:39


100 MG


 


 Glimepiride


  (Amaryl)  0.5 mg  BID  2/27/19 09:00


    2/27/19 08:08


0.5 MG


 


 Hydralazine HCl


  (Apresoline Inj)  10 mg  PRN Q4HRS  PRN  2/26/19 22:45


    2/27/19 01:16


10 MG


 


 Isosorbide


 Mononitrate


  (Imdur)  60 mg  DAILY  2/27/19 09:00


    2/27/19 08:06


60 MG


 


 Methylprednisolone


 Sodium Succinate


  (SOLU-Medrol


 125MG VIAL)  125 mg  1X  ONCE  2/26/19 20:45


 2/26/19 20:46 DC 2/26/19 21:23


125 MG


 


 Metoprolol


 Tartrate


  (Lopressor)  25 mg  BID  2/27/19 09:00


    2/27/19 08:10


25 MG


 


 Potassium Chloride


  (Klor-Con)  10 meq  BIDWMEALS  2/27/19 08:00


    2/27/19 08:07


10 MEQ


 


 Ranolazine


  (Ranexa)  500 mg  BID  2/27/19 09:00


    2/27/19 08:10


500 MG


 


 Sodium Chloride  1,000 ml @ 


 100 mls/hr  Q10H  2/26/19 20:32


 2/27/19 06:31 DC 2/26/19 21:22


100 MLS/HR


 


 Tramadol HCl


  (Ultram)  50 mg  PRN Q6HRS  PRN  2/27/19 07:45


    2/27/19 13:25


50 MG











Allergies


Allergies





Allergies








Coded Allergies Type Severity Reaction Last Updated Verified


 


  lisinopril Allergy Severe Swelling 2/2/16 Yes


 


  Penicillins Allergy Intermediate Hives 2/2/16 Yes


 


  azithromycin Allergy Intermediate Nausea and Vomiting 2/2/16 Yes


 


  cefaclor Allergy Intermediate Nausea and Vomiting 2/3/16 Yes











ROS


Review of System


CONSTITUTIONAL:        No fever or chills


EYES:                          No recent changes


SKIN:               No rash or itching


CARDIOVASCULAR:     No chest pain, syncope, palpitations, or edema


RESPIRATORY:            No SOB or cough


GASTROINTESTINAL:    No nausea, vomiting or abdominal pain


NEUROLOGICAL:          No headaches or weakness


ENDOCRINE:               No cold or heat intolerance


GENITOURINARY:         No urgency or frequency of urination


MUSCULOSKELETAL:   No back pain or joint pain


LYMPHATICS:               No enlarged lymph nodes


PSYCHIATRIC:              No anxiety or depression





Physical Exam


Physical Exam


GEN.:    No apparent distress.  Alert and oriented.


HEENT:    Head is normocephalic, atraumatic


NECK:    Supple.  


LUNGS:    Clear to auscultation.


HEART:    RRR, S1, S2 present.  Peripheral pulses intact


ABDOMEN:    Soft, nontender.  Positive bowel sounds.


EXTREMITIES:    Without any cyanosis.    


NEUROLOGIC:     Normal speech, normal tone


PSYCHIATRIC:    Normal affect, normal mood.


SKIN:   No ulcerations





Vitals


Vitals





Vital Signs








  Date Time  Temp Pulse Resp B/P (MAP) Pulse Ox O2 Delivery O2 Flow Rate FiO2


 


2/27/19 15:20 97.3 79 20 147/77 (100) 98 Room Air  





 97.3       











Labs


Labs





Laboratory Tests








Test


 2/26/19


21:25 2/26/19


21:40 2/27/19


06:00 2/27/19


07:59


 


White Blood Count


 11.1 x10^3/uL


(4.0-11.0) 


 10.0 x10^3/uL


(4.0-11.0) 





 


Red Blood Count


 4.68 x10^6/uL


(3.50-5.40) 


 4.37 x10^6/uL


(3.50-5.40) 





 


Hemoglobin


 12.7 g/dL


(12.0-15.5) 


 12.0 g/dL


(12.0-15.5) 





 


Hematocrit


 39.0 %


(36.0-47.0) 


 36.5 %


(36.0-47.0) 





 


Mean Corpuscular Volume 83 fL ()   83 fL ()  


 


Mean Corpuscular Hemoglobin 27 pg (25-35)   27 pg (25-35)  


 


Mean Corpuscular Hemoglobin


Concent 33 g/dL


(31-37) 


 33 g/dL


(31-37) 





 


Red Cell Distribution Width


 15.3 %


(11.5-14.5) 


 15.6 %


(11.5-14.5) 





 


Platelet Count


 327 x10^3/uL


(140-400) 


 315 x10^3/uL


(140-400) 





 


Neutrophils (%) (Auto) 63 % (31-73)   87 % (31-73)  


 


Lymphocytes (%) (Auto) 26 % (24-48)   12 % (24-48)  


 


Monocytes (%) (Auto) 8 % (0-9)   1 % (0-9)  


 


Eosinophils (%) (Auto) 2 % (0-3)   0 % (0-3)  


 


Basophils (%) (Auto) 0 % (0-3)   0 % (0-3)  


 


Neutrophils # (Auto)


 7.0 x10^3uL


(1.8-7.7) 


 8.7 x10^3uL


(1.8-7.7) 





 


Lymphocytes # (Auto)


 2.9 x10^3/uL


(1.0-4.8) 


 1.2 x10^3/uL


(1.0-4.8) 





 


Monocytes # (Auto)


 0.9 x10^3/uL


(0.0-1.1) 


 0.1 x10^3/uL


(0.0-1.1) 





 


Eosinophils # (Auto)


 0.2 x10^3/uL


(0.0-0.7) 


 0.0 x10^3/uL


(0.0-0.7) 





 


Basophils # (Auto)


 0.0 x10^3/uL


(0.0-0.2) 


 0.0 x10^3/uL


(0.0-0.2) 





 


Sodium Level


 143 mmol/L


(136-145) 


 143 mmol/L


(136-145) 





 


Potassium Level


 3.4 mmol/L


(3.5-5.1) 


 3.6 mmol/L


(3.5-5.1) 





 


Chloride Level


 102 mmol/L


() 


 104 mmol/L


() 





 


Carbon Dioxide Level


 32 mmol/L


(21-32) 


 28 mmol/L


(21-32) 





 


Anion Gap 9 (6-14)   11 (6-14)  


 


Blood Urea Nitrogen


 24 mg/dL


(7-20) 


 24 mg/dL


(7-20) 





 


Creatinine


 1.0 mg/dL


(0.6-1.0) 


 1.2 mg/dL


(0.6-1.0) 





 


Estimated GFR


(Cockcroft-Gault) 64.9 


 


 52.6 


 





 


BUN/Creatinine Ratio 24 (6-20)    


 


Glucose Level


 118 mg/dL


(70-99) 


 198 mg/dL


(70-99) 





 


Calcium Level


 9.3 mg/dL


(8.5-10.1) 


 9.4 mg/dL


(8.5-10.1) 





 


Total Bilirubin


 0.4 mg/dL


(0.2-1.0) 


 


 





 


Aspartate Amino Transf


(AST/SGOT) 27 U/L (15-37) 


 


 


 





 


Alanine Aminotransferase


(ALT/SGPT) 20 U/L (14-59) 


 


 


 





 


Alkaline Phosphatase


 51 U/L


() 


 


 





 


Total Protein


 7.5 g/dL


(6.4-8.2) 


 


 





 


Albumin


 3.5 g/dL


(3.4-5.0) 


 


 





 


Albumin/Globulin Ratio 0.9 (1.0-1.7)    


 


Group A Streptococcus Rapid


 


 Negative


(NEGATIVE) 


 





 


Segmented Neutrophils %   86 % (35-66)  


 


Lymphocytes %   13 % (24-48)  


 


Monocytes %   1 % (0-10)  


 


Platelet Estimate


 


 


 Adequate


(ADEQUATE) 





 


Glucose (Fingerstick)


 


 


 


 190 mg/dL


(70-99)


 


Test


 2/27/19


11:59 


 


 





 


Glucose (Fingerstick)


 182 mg/dL


(70-99) 


 


 











Laboratory Tests








Test


 2/26/19


21:25 2/26/19


21:40 2/27/19


06:00 2/27/19


07:59


 


White Blood Count


 11.1 x10^3/uL


(4.0-11.0) 


 10.0 x10^3/uL


(4.0-11.0) 





 


Red Blood Count


 4.68 x10^6/uL


(3.50-5.40) 


 4.37 x10^6/uL


(3.50-5.40) 





 


Hemoglobin


 12.7 g/dL


(12.0-15.5) 


 12.0 g/dL


(12.0-15.5) 





 


Hematocrit


 39.0 %


(36.0-47.0) 


 36.5 %


(36.0-47.0) 





 


Mean Corpuscular Volume 83 fL ()   83 fL ()  


 


Mean Corpuscular Hemoglobin 27 pg (25-35)   27 pg (25-35)  


 


Mean Corpuscular Hemoglobin


Concent 33 g/dL


(31-37) 


 33 g/dL


(31-37) 





 


Red Cell Distribution Width


 15.3 %


(11.5-14.5) 


 15.6 %


(11.5-14.5) 





 


Platelet Count


 327 x10^3/uL


(140-400) 


 315 x10^3/uL


(140-400) 





 


Neutrophils (%) (Auto) 63 % (31-73)   87 % (31-73)  


 


Lymphocytes (%) (Auto) 26 % (24-48)   12 % (24-48)  


 


Monocytes (%) (Auto) 8 % (0-9)   1 % (0-9)  


 


Eosinophils (%) (Auto) 2 % (0-3)   0 % (0-3)  


 


Basophils (%) (Auto) 0 % (0-3)   0 % (0-3)  


 


Neutrophils # (Auto)


 7.0 x10^3uL


(1.8-7.7) 


 8.7 x10^3uL


(1.8-7.7) 





 


Lymphocytes # (Auto)


 2.9 x10^3/uL


(1.0-4.8) 


 1.2 x10^3/uL


(1.0-4.8) 





 


Monocytes # (Auto)


 0.9 x10^3/uL


(0.0-1.1) 


 0.1 x10^3/uL


(0.0-1.1) 





 


Eosinophils # (Auto)


 0.2 x10^3/uL


(0.0-0.7) 


 0.0 x10^3/uL


(0.0-0.7) 





 


Basophils # (Auto)


 0.0 x10^3/uL


(0.0-0.2) 


 0.0 x10^3/uL


(0.0-0.2) 





 


Sodium Level


 143 mmol/L


(136-145) 


 143 mmol/L


(136-145) 





 


Potassium Level


 3.4 mmol/L


(3.5-5.1) 


 3.6 mmol/L


(3.5-5.1) 





 


Chloride Level


 102 mmol/L


() 


 104 mmol/L


() 





 


Carbon Dioxide Level


 32 mmol/L


(21-32) 


 28 mmol/L


(21-32) 





 


Anion Gap 9 (6-14)   11 (6-14)  


 


Blood Urea Nitrogen


 24 mg/dL


(7-20) 


 24 mg/dL


(7-20) 





 


Creatinine


 1.0 mg/dL


(0.6-1.0) 


 1.2 mg/dL


(0.6-1.0) 





 


Estimated GFR


(Cockcroft-Gault) 64.9 


 


 52.6 


 





 


BUN/Creatinine Ratio 24 (6-20)    


 


Glucose Level


 118 mg/dL


(70-99) 


 198 mg/dL


(70-99) 





 


Calcium Level


 9.3 mg/dL


(8.5-10.1) 


 9.4 mg/dL


(8.5-10.1) 





 


Total Bilirubin


 0.4 mg/dL


(0.2-1.0) 


 


 





 


Aspartate Amino Transf


(AST/SGOT) 27 U/L (15-37) 


 


 


 





 


Alanine Aminotransferase


(ALT/SGPT) 20 U/L (14-59) 


 


 


 





 


Alkaline Phosphatase


 51 U/L


() 


 


 





 


Total Protein


 7.5 g/dL


(6.4-8.2) 


 


 





 


Albumin


 3.5 g/dL


(3.4-5.0) 


 


 





 


Albumin/Globulin Ratio 0.9 (1.0-1.7)    


 


Group A Streptococcus Rapid


 


 Negative


(NEGATIVE) 


 





 


Segmented Neutrophils %   86 % (35-66)  


 


Lymphocytes %   13 % (24-48)  


 


Monocytes %   1 % (0-10)  


 


Platelet Estimate


 


 


 Adequate


(ADEQUATE) 





 


Glucose (Fingerstick)


 


 


 


 190 mg/dL


(70-99)


 


Test


 2/27/19


11:59 


 


 





 


Glucose (Fingerstick)


 182 mg/dL


(70-99) 


 


 














VTE Prophylaxis Ordered


VTE Prophylaxis Devices:  No


VTE Pharmacological Prophylaxi:  No





Assessment/Plan


Assessment/Plan


Hypertensive urgency improved


Angioedema? secondary to ARB most likely will discontinue benicar


Thrush 


Recent cardiac ablation


diabetes mellitus type 2


histoyf o dyslipidemia








Plan:


restart home meds


start chlorthalidone


start diflucan for thrush


reasess in the am


will consult cardiology for evaluation given her recent cardiac ablation





DVT prophylaxis:scd adn teds





Problem Qualifiers





(1) Angio-edema:  


Encounter type:  initial encounter  Qualified Codes:  T78.3XXA - Angioneurotic 

edema, initial encounter








YVES GEIGER MD Feb 27, 2019 17:11

## 2019-02-27 NOTE — NUR
Pt arrived to room 209 via wheelchair from the ER at 0040.  She transferred to her bed with 
a steady gait.  Family accompanied her and she is an accurate historian.  No complaints of 
pain, dizzy, soa.  Tongue is white-cira with normal moisture and pt states it is much 
improved since treatment in the ER.  Pt still hypertensive, see vitals.  Pt was treated by 
Dr. Torres (-sp?) cardiac electrophysiology at [facility] yesterday with an 
ablation for V-tach.  PRN hydralazine administered per MAR.

## 2019-02-27 NOTE — NUR
SS following for discharge planning. SS reviewed pt chart. Pt is from home and currently on 
room air. No discharge needs noted at this time. SS will continue to follow for pending 
discharge needs.

## 2019-02-28 VITALS — SYSTOLIC BLOOD PRESSURE: 141 MMHG | DIASTOLIC BLOOD PRESSURE: 58 MMHG

## 2019-02-28 VITALS — SYSTOLIC BLOOD PRESSURE: 147 MMHG | DIASTOLIC BLOOD PRESSURE: 60 MMHG

## 2019-02-28 VITALS — DIASTOLIC BLOOD PRESSURE: 64 MMHG | SYSTOLIC BLOOD PRESSURE: 151 MMHG

## 2019-02-28 VITALS — DIASTOLIC BLOOD PRESSURE: 53 MMHG | SYSTOLIC BLOOD PRESSURE: 128 MMHG

## 2019-02-28 VITALS — DIASTOLIC BLOOD PRESSURE: 62 MMHG | SYSTOLIC BLOOD PRESSURE: 128 MMHG

## 2019-02-28 VITALS — SYSTOLIC BLOOD PRESSURE: 136 MMHG | DIASTOLIC BLOOD PRESSURE: 66 MMHG

## 2019-02-28 RX ADMIN — METOPROLOL TARTRATE SCH MG: 25 TABLET ORAL at 21:38

## 2019-02-28 RX ADMIN — POTASSIUM CHLORIDE SCH MEQ: 750 TABLET, FILM COATED, EXTENDED RELEASE ORAL at 17:31

## 2019-02-28 RX ADMIN — FLUCONAZOLE SCH MG: 100 TABLET ORAL at 08:41

## 2019-02-28 RX ADMIN — ASPIRIN SCH MG: 81 TABLET, COATED ORAL at 08:38

## 2019-02-28 RX ADMIN — ISOSORBIDE MONONITRATE SCH MG: 30 TABLET, EXTENDED RELEASE ORAL at 08:48

## 2019-02-28 RX ADMIN — CETIRIZINE HYDROCHLORIDE SCH MG: 10 TABLET, FILM COATED ORAL at 11:28

## 2019-02-28 RX ADMIN — POTASSIUM CHLORIDE SCH MEQ: 750 TABLET, FILM COATED, EXTENDED RELEASE ORAL at 08:36

## 2019-02-28 RX ADMIN — CLOPIDOGREL BISULFATE SCH MG: 75 TABLET ORAL at 08:38

## 2019-02-28 RX ADMIN — ATORVASTATIN CALCIUM SCH MG: 20 TABLET, FILM COATED ORAL at 21:38

## 2019-02-28 RX ADMIN — CHLORTHALIDONE SCH MG: 25 TABLET ORAL at 08:44

## 2019-02-28 RX ADMIN — FENOFIBRATE SCH MG: 54 TABLET ORAL at 08:38

## 2019-02-28 RX ADMIN — GLIMEPIRIDE SCH MG: 2 TABLET ORAL at 17:31

## 2019-02-28 RX ADMIN — GLIMEPIRIDE SCH MG: 2 TABLET ORAL at 08:36

## 2019-02-28 RX ADMIN — RANOLAZINE SCH MG: 500 TABLET, FILM COATED, EXTENDED RELEASE ORAL at 21:39

## 2019-02-28 RX ADMIN — METOPROLOL TARTRATE SCH MG: 25 TABLET ORAL at 08:46

## 2019-02-28 RX ADMIN — RANOLAZINE SCH MG: 500 TABLET, FILM COATED, EXTENDED RELEASE ORAL at 08:44

## 2019-02-28 NOTE — PDOC
PROGRESS NOTES


Chief Complaint


Chief Complaint


Hypertensive urgency improved


Angioedema? secondary to ARB? will discontinue benicar


Thrush Improved 


Recent cardiac ablation


diabetes mellitus type 2


history of dyslipidemia








Plan:


restart home meds


start chlorthalidone


continue Diflucan for thrush


reassess in the am





DVT prophylaxis:scd adn teds





History of Present Illness


History of Present Illness


Patient feeling much better patient wiht no acute events reported overnight, 

patient with no chest pain , tongue feels better has not had swelling or stridor





Vitals


Vitals





Vital Signs








  Date Time  Temp Pulse Resp B/P (MAP) Pulse Ox O2 Delivery O2 Flow Rate FiO2


 


2/28/19 11:11 97.6 63 18 128/53 (78) 91 Room Air  





 97.6       











Physical Exam


General:  Alert, Oriented X3, Cooperative, No acute distress


Heart:  Regular rate (SR), Normal S1, Normal S2


Lungs:  Clear


Abdomen:  Soft, No tenderness


Extremities:  No cyanosis, Other (trace LE edema)


Skin:  No breakdown, No significant lesion





Labs


LABS





Laboratory Tests








Test


 2/27/19


17:51 2/28/19


07:05 2/28/19


10:55 2/28/19


11:40


 


Glucose (Fingerstick)


 149 mg/dL


(70-99) 142 mg/dL


(70-99) 


 169 mg/dL


(70-99)


 


Erythrocyte Sedimentation Rate   30 (0-25)  











Assessment and Plan


Assessmemt and Plan


Problems


Medical Problems:


(1) Angio-edema


Status: Acute  





(2) Hypertensive urgency


Status: Acute  











Comment


Review of Relevant


I have reviewed the following items bruce (where applicable) has been applied.


Labs





Laboratory Tests








Test


 2/26/19


21:25 2/26/19


21:40 2/27/19


06:00 2/27/19


07:59


 


White Blood Count


 11.1 x10^3/uL


(4.0-11.0) 


 10.0 x10^3/uL


(4.0-11.0) 





 


Red Blood Count


 4.68 x10^6/uL


(3.50-5.40) 


 4.37 x10^6/uL


(3.50-5.40) 





 


Hemoglobin


 12.7 g/dL


(12.0-15.5) 


 12.0 g/dL


(12.0-15.5) 





 


Hematocrit


 39.0 %


(36.0-47.0) 


 36.5 %


(36.0-47.0) 





 


Mean Corpuscular Volume 83 fL ()   83 fL ()  


 


Mean Corpuscular Hemoglobin 27 pg (25-35)   27 pg (25-35)  


 


Mean Corpuscular Hemoglobin


Concent 33 g/dL


(31-37) 


 33 g/dL


(31-37) 





 


Red Cell Distribution Width


 15.3 %


(11.5-14.5) 


 15.6 %


(11.5-14.5) 





 


Platelet Count


 327 x10^3/uL


(140-400) 


 315 x10^3/uL


(140-400) 





 


Neutrophils (%) (Auto) 63 % (31-73)   87 % (31-73)  


 


Lymphocytes (%) (Auto) 26 % (24-48)   12 % (24-48)  


 


Monocytes (%) (Auto) 8 % (0-9)   1 % (0-9)  


 


Eosinophils (%) (Auto) 2 % (0-3)   0 % (0-3)  


 


Basophils (%) (Auto) 0 % (0-3)   0 % (0-3)  


 


Neutrophils # (Auto)


 7.0 x10^3uL


(1.8-7.7) 


 8.7 x10^3uL


(1.8-7.7) 





 


Lymphocytes # (Auto)


 2.9 x10^3/uL


(1.0-4.8) 


 1.2 x10^3/uL


(1.0-4.8) 





 


Monocytes # (Auto)


 0.9 x10^3/uL


(0.0-1.1) 


 0.1 x10^3/uL


(0.0-1.1) 





 


Eosinophils # (Auto)


 0.2 x10^3/uL


(0.0-0.7) 


 0.0 x10^3/uL


(0.0-0.7) 





 


Basophils # (Auto)


 0.0 x10^3/uL


(0.0-0.2) 


 0.0 x10^3/uL


(0.0-0.2) 





 


Sodium Level


 143 mmol/L


(136-145) 


 143 mmol/L


(136-145) 





 


Potassium Level


 3.4 mmol/L


(3.5-5.1) 


 3.6 mmol/L


(3.5-5.1) 





 


Chloride Level


 102 mmol/L


() 


 104 mmol/L


() 





 


Carbon Dioxide Level


 32 mmol/L


(21-32) 


 28 mmol/L


(21-32) 





 


Anion Gap 9 (6-14)   11 (6-14)  


 


Blood Urea Nitrogen


 24 mg/dL


(7-20) 


 24 mg/dL


(7-20) 





 


Creatinine


 1.0 mg/dL


(0.6-1.0) 


 1.2 mg/dL


(0.6-1.0) 





 


Estimated GFR


(Cockcroft-Gault) 64.9 


 


 52.6 


 





 


BUN/Creatinine Ratio 24 (6-20)    


 


Glucose Level


 118 mg/dL


(70-99) 


 198 mg/dL


(70-99) 





 


Calcium Level


 9.3 mg/dL


(8.5-10.1) 


 9.4 mg/dL


(8.5-10.1) 





 


Total Bilirubin


 0.4 mg/dL


(0.2-1.0) 


 


 





 


Aspartate Amino Transf


(AST/SGOT) 27 U/L (15-37) 


 


 


 





 


Alanine Aminotransferase


(ALT/SGPT) 20 U/L (14-59) 


 


 


 





 


Alkaline Phosphatase


 51 U/L


() 


 


 





 


Total Protein


 7.5 g/dL


(6.4-8.2) 


 


 





 


Albumin


 3.5 g/dL


(3.4-5.0) 


 


 





 


Albumin/Globulin Ratio 0.9 (1.0-1.7)    


 


Group A Streptococcus Rapid


 


 Negative


(NEGATIVE) 


 





 


Segmented Neutrophils %   86 % (35-66)  


 


Lymphocytes %   13 % (24-48)  


 


Monocytes %   1 % (0-10)  


 


Platelet Estimate


 


 


 Adequate


(ADEQUATE) 





 


Glucose (Fingerstick)


 


 


 


 190 mg/dL


(70-99)


 


Test


 2/27/19


11:59 2/27/19


17:51 2/28/19


07:05 2/28/19


10:55


 


Glucose (Fingerstick)


 182 mg/dL


(70-99) 149 mg/dL


(70-99) 142 mg/dL


(70-99) 





 


Erythrocyte Sedimentation Rate    30 (0-25) 


 


Test


 2/28/19


11:40 


 


 





 


Glucose (Fingerstick)


 169 mg/dL


(70-99) 


 


 











Laboratory Tests








Test


 2/27/19


17:51 2/28/19


07:05 2/28/19


10:55 2/28/19


11:40


 


Glucose (Fingerstick)


 149 mg/dL


(70-99) 142 mg/dL


(70-99) 


 169 mg/dL


(70-99)


 


Erythrocyte Sedimentation Rate   30 (0-25)  








Medications





Current Medications


Methylprednisolone Sodium Succinate (SOLU-Medrol 125MG VIAL) 125 mg 1X  ONCE IV

  Last administered on 2/26/19at 21:23;  Start 2/26/19 at 20:45;  Stop 2/26/19 

at 20:46;  Status DC


Diphenhydramine HCl (Benadryl) 25 mg 1X  ONCE IV  Last administered on 2/26/ 19at 21:23;  Start 2/26/19 at 20:45;  Stop 2/26/19 at 20:46;  Status DC


Famotidine (Pepcid Vial) 20 mg 1X  ONCE IVP  Last administered on 2/26/19at 21:

23;  Start 2/26/19 at 20:45;  Stop 2/26/19 at 20:46;  Status DC


Sodium Chloride 1,000 ml @  100 mls/hr Q10H IV  Last administered on 2/26/19at 

21:22;  Start 2/26/19 at 20:32;  Stop 2/27/19 at 06:31;  Status DC


Clonidine HCl (Catapres) 0.2 mg 1X  ONCE PO  Last administered on 2/26/19at 22:

28;  Start 2/26/19 at 22:15;  Stop 2/26/19 at 22:16;  Status DC


Hydralazine HCl (Apresoline Inj) 10 mg PRN Q4HRS  PRN IVP SBP>190 Last 

administered on 2/27/19at 01:16;  Start 2/26/19 at 22:45


Amlodipine Besylate (Norvasc) 10 mg DAILY PO  Last administered on 2/28/19at 08:

46;  Start 2/27/19 at 09:00


Aspirin (Ecotrin) 81 mg DAILY PO  Last administered on 2/28/19at 08:38;  Start 2 /27/19 at 09:00


Clonidine HCl (Catapres) 0.1 mg DAILY PO  Last administered on 2/28/19at 08:45;

  Start 2/27/19 at 09:00;  Stop 2/28/19 at 10:28;  Status DC


Clopidogrel Bisulfate (Plavix) 75 mg DAILY PO  Last administered on 2/28/19at 08

:38;  Start 2/27/19 at 09:00


Metoprolol Tartrate (Lopressor) 25 mg BID PO  Last administered on 2/28/19at 08:

46;  Start 2/27/19 at 09:00


Potassium Chloride (Klor-Con) 10 meq BIDWMEALS PO  Last administered on 2/28/ 19at 08:36;  Start 2/27/19 at 08:00


Ranolazine (Ranexa) 500 mg BID PO  Last administered on 2/28/19at 08:44;  Start 

2/27/19 at 09:00


Tramadol HCl (Ultram) 50 mg PRN Q6HRS  PRN PO PAIN Last administered on 2/27/ 19at 18:32;  Start 2/27/19 at 07:45


Fenofibrate (Lofibra) 54 mg DAILY PO  Last administered on 2/28/19at 08:38;  

Start 2/27/19 at 09:00


Glimepiride (Amaryl) 0.5 mg BID PO  Last administered on 2/27/19at 08:08;  

Start 2/27/19 at 09:00;  Stop 2/27/19 at 18:35;  Status DC


Isosorbide Mononitrate (Imdur) 60 mg DAILY PO  Last administered on 2/28/19at 08

:48;  Start 2/27/19 at 09:00


Atorvastatin Calcium (Lipitor) 20 mg QHS PO  Last administered on 2/27/19at 21:

13;  Start 2/27/19 at 21:00


Chlorthalidone (Thalitone) 25 mg DAILY PO  Last administered on 2/28/19at 08:44

;  Start 2/27/19 at 11:00


Fluconazole (Diflucan) 100 mg DAILY PO  Last administered on 2/28/19at 08:41;  

Start 2/27/19 at 10:45


Glimepiride (Amaryl) 1 mg BIDWMEALS PO  Last administered on 2/28/19at 08:36;  

Start 2/28/19 at 08:00


Prednisone (Prednisone) 40 mg DAILY PO  Last administered on 2/28/19at 08:38;  

Start 2/27/19 at 21:00


Cetirizine HCl (ZyrTEC) 10 mg DAILY PO  Last administered on 2/28/19at 11:28;  

Start 2/28/19 at 11:00


Clonidine HCl (Catapres Tts-1) 1 patch WEEKLY TD  Last administered on 2/28/

19at 11:32;  Start 2/28/19 at 11:00





Active Scripts


Active


Metoprolol Tartrate 25 Mg Tablet 25 Mg PO BID


     SIG: one p.o. BID


Reported


Tramadol Hcl 50 Mg Tablet 50 Mg PO Q6HRS PRN


Amlodipine Besylate 10 Mg Tablet 10 Mg PO DAILY


Clopidogrel (Clopidogrel Bisulfate) 75 Mg Tablet 1 Tab PO DAILY


Ranexa (Ranolazine) 500 Mg Tab.er.12h 1 Tab PO BID


Potassium Chloride 10 Meq Tablet.er 10 Meq PO BID


Isosorbide Mononitrate Er (Isosorbide Mononitrate) 60 Mg Tab.er.24h 1 Tab PO 

DAILY


Benicar Hct 40-25 Mg Tablet (Olmesartan/Hydrochlorothiazide) 1 Each Tablet 1 

Tab PO DAILY


Tricor (Fenofibrate Nanocrystallized) 48 Mg Tablet 48 Mg PO DAILY


Glimepiride 1 Mg Tablet 0.5 Tab PO BID


Crestor (Rosuvastatin Calcium) 5 Mg Tablet 1 Tab PO DAILY


Clonidine Hcl 0.1 Mg Tablet 1 Tab PO DAILY


Aspir 81 (Aspirin) 81 Mg Tablet. 1 Tab PO DAILY


Vitals/I & O





Vital Sign - Last 24 Hours








 2/27/19 2/27/19 2/27/19 2/27/19





 15:20 18:32 19:40 19:51


 


Temp 97.3   98.0





 97.3   98.0


 


Pulse 79   82


 


Resp 20  16 18


 


B/P (MAP) 147/77 (100)   181/74 (109)


 


Pulse Ox 98 98 98 98


 


O2 Delivery Room Air Room Air Room Air Room Air


 


    





    





 2/27/19 2/27/19 2/27/19 2/27/19





 20:00 21:14 21:15 23:20


 


Temp    97.5





    97.5


 


Pulse  82 82 72


 


Resp    17


 


B/P (MAP)  181/74 181/74 147/77 (100)


 


Pulse Ox    97


 


O2 Delivery Room Air   Room Air


 


    





    





 2/28/19 2/28/19 2/28/19 2/28/19





 02:35 07:00 08:00 08:44


 


Temp 98.0 97.7  





 98.0 97.7  


 


Pulse 70 75  75


 


Resp 18 18  


 


B/P (MAP) 136/66 (89) 128/62 (84)  166/74


 


Pulse Ox 98 95  


 


O2 Delivery Room Air Room Air Room Air 


 


    





    





 2/28/19 2/28/19 2/28/19 2/28/19





 08:45 08:46 08:46 08:48


 


Pulse 75 75 75 75


 


B/P (MAP) 166/74 166/74 166/74 166/74





 2/28/19   





 11:11   


 


Temp 97.6   





 97.6   


 


Pulse 63   


 


Resp 18   


 


B/P (MAP) 128/53 (78)   


 


Pulse Ox 91   


 


O2 Delivery Room Air   














Intake and Output   


 


 2/27/19 2/27/19 2/28/19





 15:00 23:00 07:00


 


Intake Total  600 ml 900 ml


 


Output Total 750 ml 1000 ml 175 ml


 


Balance -750 ml -400 ml 725 ml

















YVES GEIGER MD Feb 28, 2019 13:58

## 2019-02-28 NOTE — PDOC2
CELSO FRAGA APRN 2/28/19 1029:


CARDIAC CONSULT


DATE OF CONSULT


Date of Consult


DATE: 2/28/19 


TIME: 09:55





REASON FOR CONSULT


Reason for Consult:


Angioedema, HTN urgency





REFERRING PHYSICIAN


Referring Physician:


Shea





SOURCE


Source:  Chart review, Patient





HISTORY OF PRESENT ILLNESS


HISTORY OF PRESENT ILLNESS


This is a pleasant 79 yo female admitted for complains of tongue swelling.  

Some of her BP meds were held for about 2 days in anticipation of cardiac 

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  The procedure was successfully done Monday and was 

discharged Tuesday.  She had whitish coloration with some irritation in her 

gums and throat prior to DC at that time and was told to gargle salt.  No prior 

antibiotics.  This then progressed to swelling in her tongue and gums and could 

not talk clearly.  She then went to [facility] ED and was suspected of possible thrush 

and angioedema.  She is allergic to lisinopril with angioedema but has been 

tolerating benicar for over 2 yrs now without issues until her ablation.  

Denies any chest pain or SOA.  Her arteriotomy sites in her groin are normal 

with no swelling or pain.  Her BP meds was just resumed after the procedure and 

as an inpt her BP has been labile. She was given multiple antiinflammatories 

via IV and responded well.





PAST MEDICAL HISTORY


Cardiovascular:  CAD (known  to RCA with left to right collaterals), HTN, 

Hyperlipidemia, Other (PAD; AVNRT; carotid artery disease; subclavian steal )


Pulmonary:  Asthma


CENTRAL NERVOUS SYSTEM:  Other (No pertinent history)


GI:  No pertinent hx


Heme/Onc:  Anemia NOS


Psych:  No pertinent hx


Musculoskeletal:  Osteoarthritis


Rheumatologic:  No pertinent hx


Infectious disease:  No pertinent hx


ENT:  Allergic Rhinitis


Renal/:  Chronic renal insuff


Endocrine:  Diabetes (2)





PAST SURGICAL HISTORY


Past Surgical History:  Arthroscopy (left shoulder), Cataract Removal (left), 

Hysterectomy, Other (Loop recorder ([facility]); recent cardiac ablation 2/25/2019

; back surgery; left bunionectomy; aorto iliac bypass graft 2/2016)





FAMILY HISTORY


Family History:  Heart Disease





SOCIAL HISTORY


Smoke:  Quit


ALCOHOL:  none


Drugs:  None


Lives:  with Family





CURRENT MEDICATIONS


CURRENT MEDICATIONS





Current Medications








 Medications


  (Trade)  Dose


 Ordered  Sig/Nessa


 Route


 PRN Reason  Start Time


 Stop Time Status Last Admin


Dose Admin


 


 Atorvastatin


 Calcium


  (Lipitor)  20 mg  QHS


 PO


   2/27/19 21:00


    2/27/19 21:13





 


 Chlorthalidone


  (Thalitone)  25 mg  DAILY


 PO


   2/27/19 11:00


    2/28/19 08:44





 


 Fluconazole


  (Diflucan)  100 mg  DAILY


 PO


   2/27/19 10:45


    2/28/19 08:41





 


 Glimepiride


  (Amaryl)  1 mg  BIDWMEALS


 PO


   2/28/19 08:00


    2/28/19 08:36





 


 Prednisone


  (Prednisone)  40 mg  DAILY


 PO


   2/27/19 21:00


    2/28/19 08:38














ALLERGIES


ALLERGIES:  


Coded Allergies:  


     lisinopril (Verified  Allergy, Severe, Swelling, 2/2/16)


 


 swelling in mouth and throat


     Penicillins (Verified  Allergy, Intermediate, Hives, 2/2/16)


     azithromycin (Verified  Allergy, Intermediate, Nausea and Vomiting, 2/2/16)


     cefaclor (Verified  Allergy, Intermediate, Nausea and Vomiting, 2/3/16)





ROS


Review of System


14 point ROS evaluated with pertinent positives noted per HPI





PHYSICAL EXAM


General:  Alert, Oriented X3, Cooperative, No acute distress


HEENT:  Atraumatic, Mucous membr. moist/pink


Lungs:  Clear to auscultation, Normal air movement


Heart:  Regular rate (SR), Normal S1, Normal S2


Abdomen:  Soft, No tenderness


Extremities:  No cyanosis, Other (trace LE edema)


Skin:  No breakdown, No significant lesion


Neuro:  Normal speech, Sensation intact


Psych/Mental Status:  Mental status NL, Mood NL


MUSCULOSKELETAL:  Osteoarthritic changes both hands





VITALS


VITALS





Vital Signs








  Date Time  Temp Pulse Resp B/P (MAP) Pulse Ox O2 Delivery O2 Flow Rate FiO2


 


2/28/19 08:48  75  166/74    


 


2/28/19 07:00 97.7  18  95 Room Air  





 97.7       











LABS


Lab:





Laboratory Tests








Test


 2/27/19


11:59 2/27/19


17:51 2/28/19


07:05


 


Glucose (Fingerstick)


 182 mg/dL


(70-99) 149 mg/dL


(70-99) 142 mg/dL


(70-99)











ECHOCARDIOGRAM


ECHOCARDIOGRAM





<Conclusion>


The left ventricular systolic function is normal and the ejection fraction is 

within normal range. EF 55%


There is normal LV segmental wall motion.





DATE:     03/22/18 1138





STRESS TEST


STRESS TEST





Conclusion


1. Abnormal baseline EKG with nonspecific ST-T wave changes which become more 

prominent with stress. This is suggestive but not diagnostic of ischemia.


2. Nuclear imaging shows no reversible ischemia.


3. Nuclear imaging shows fixed mild inferior wall thinning which is consistent 

with an attenuation defect.


4. Normal left ventricular systolic function with no regional wall motion 

abnormalities and an ejection fraction of greater than 70%.


5. Moderately low risk Lexiscan nuclear stress test with no reversible ischemia 

and normal LV systolic function.





DATE:     11/30/18 1258





ASSESSMENT/PLAN


ASSESSMENT/PLAN


1. Angioedema: no urticaria. Noted with tongue swelling, gums resolved with 

antihistamines. Suspect drug induced from recent cardiac ablation. 


2. Possible thrush: per PCP


3. 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 No complications


4. CAD: past stents.  Clinically stable.


5. PAD: clinically stable


6. Accelerated HTN: due to missed doses of BP meds


7. DM2/HLP


8. Loop recorder in situ: ([facility])








Recommendations


1. Allergic to lisinopril (angioedema) and has been tolerating on benicar for >

2 yrs.  As a precaution for possible cross reaction will DC ARB.  I would 

suspect a medication perioperatively with ablation precipitated this event.  

Will monitor in the next 24 hours and note any rebound events.  Will place on 

zyrtec. Continue with prednisone.  


2. Restart BP meds and will add additional BP meds to compensate removal of ARB 

pending BP trend. Will place on TTS-1 to avoid rebound HTN with noted daily 

clonidine PO. 


3. Continue with secondary prevention and ASA/plavix.





ROSE WESTON MD 3/1/19 0705:


CARDIAC CONSULT


ASSESSMENT/PLAN


ASSESSMENT/PLAN


Patient seen and examined 2/28/19.  Agree with NP's assessment and plan.


AVNRT s/p recent successful ablation presenting with angioedema and accelerated 

hypertension


Symptoms improving with steroids


Resume home antihypertensives and titrate for better blood pressure control 


CAD status clinically stable


Thank you for your consultation











CELSO FRAGA Feb 28, 2019 10:29


ROSE WESTON MD Mar 1, 2019 07:05

## 2019-03-01 VITALS
DIASTOLIC BLOOD PRESSURE: 75 MMHG | SYSTOLIC BLOOD PRESSURE: 147 MMHG | SYSTOLIC BLOOD PRESSURE: 147 MMHG | DIASTOLIC BLOOD PRESSURE: 75 MMHG | SYSTOLIC BLOOD PRESSURE: 147 MMHG | SYSTOLIC BLOOD PRESSURE: 147 MMHG | SYSTOLIC BLOOD PRESSURE: 147 MMHG | SYSTOLIC BLOOD PRESSURE: 147 MMHG | DIASTOLIC BLOOD PRESSURE: 75 MMHG | DIASTOLIC BLOOD PRESSURE: 75 MMHG | SYSTOLIC BLOOD PRESSURE: 147 MMHG | DIASTOLIC BLOOD PRESSURE: 75 MMHG

## 2019-03-01 VITALS — SYSTOLIC BLOOD PRESSURE: 180 MMHG | DIASTOLIC BLOOD PRESSURE: 74 MMHG

## 2019-03-01 VITALS — SYSTOLIC BLOOD PRESSURE: 147 MMHG | DIASTOLIC BLOOD PRESSURE: 75 MMHG

## 2019-03-01 RX ADMIN — POTASSIUM CHLORIDE SCH MEQ: 750 TABLET, FILM COATED, EXTENDED RELEASE ORAL at 08:52

## 2019-03-01 RX ADMIN — FENOFIBRATE SCH MG: 54 TABLET ORAL at 08:39

## 2019-03-01 RX ADMIN — FLUCONAZOLE SCH MG: 100 TABLET ORAL at 08:38

## 2019-03-01 RX ADMIN — CLOPIDOGREL BISULFATE SCH MG: 75 TABLET ORAL at 08:40

## 2019-03-01 RX ADMIN — METOPROLOL TARTRATE SCH MG: 25 TABLET ORAL at 08:42

## 2019-03-01 RX ADMIN — GLIMEPIRIDE SCH MG: 2 TABLET ORAL at 08:39

## 2019-03-01 RX ADMIN — ASPIRIN SCH MG: 81 TABLET, COATED ORAL at 08:52

## 2019-03-01 RX ADMIN — CHLORTHALIDONE SCH MG: 25 TABLET ORAL at 08:40

## 2019-03-01 RX ADMIN — RANOLAZINE SCH MG: 500 TABLET, FILM COATED, EXTENDED RELEASE ORAL at 08:44

## 2019-03-01 RX ADMIN — ISOSORBIDE MONONITRATE SCH MG: 30 TABLET, EXTENDED RELEASE ORAL at 08:44

## 2019-03-01 RX ADMIN — CETIRIZINE HYDROCHLORIDE SCH MG: 10 TABLET, FILM COATED ORAL at 08:41

## 2019-03-01 NOTE — PDOC
CELSO FRAGA APRN 3/1/19 1247:


CARDIO Progress Notes


Date and Time


Date of Service


3/1/2019


Time of Evaluation


1120





Subjective


Subjective:  No Chest Pain, No shortness of breath, No Palpitations





Vitals


Vitals





Vital Signs








  Date Time  Temp Pulse Resp B/P (MAP) Pulse Ox O2 Delivery O2 Flow Rate FiO2


 


3/1/19 08:45  75  147/75    


 


3/1/19 08:01 97.9  18  98 Room Air  





 97.9       








Weight


Weight [ ]





Input and Output


Intake and Output











Intake and Output 


 


 3/1/19





 07:00


 


Intake Total 2000 ml


 


Output Total 2500 ml


 


Balance -500 ml


 


 


 


Intake Oral 2000 ml


 


Output Urine Total 2500 ml


 


# Voids 1











Laboratory


Labs





Laboratory Tests








Test


 2/28/19


16:42 3/1/19


08:36 3/1/19


12:06


 


Glucose (Fingerstick)


 155 mg/dL


(70-99) 84 mg/dL


(70-99) 120 mg/dL


(70-99)











Physical Exam


HEENT:  Neck Supple W Full Motion


Chest:  Symmetric


LUNGS:  Clear to Auscultation


Heart:  S1S2, RRR (SR no significant ectopies)


Abdomen:  Soft N/T


Extremities:  No Calf Tenderness


Neurology:  alert, oriented, follow commands





Assessment


Assessment


1. Angioedema: no urticaria. Noted with tongue swelling, gums resolved with 

antihistamines. Suspect drug induced from recent cardiac ablation. 


2. Possible thrush: per PCP


3. 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 No complications. Recent EF nml


4. CAD: past stents.  Clinically stable.


5. PAD: clinically stable


6. Accelerated HTN: BP better


7. DM2/HLP


8. Loop recorder in situ: ([facility])








Recommendations


1. Allergic to lisinopril (angioedema) and has been tolerating on benicar for >

2 yrs.  As a precaution for possible cross reaction will DC ARB.  I would 

suspect a medication perioperatively with ablation precipitated this event.  

Continue with zyrtec and steroid to defer to PCP


2. Continue BP meds except for benicar and will provide Rx for catapress 

transdermal TTS1. Encouraged HBPM


3. Continue with secondary prevention and ASA/plavix.


4. Follow up as scheduled





ROSE WESTON MD 3/1/19 2041:


CARDIO Progress Notes


Assessment


Assessment


Patient seen and examined.  Agree with NP's assessment and plan.


Angioedema improved


CAD status stable


s/p AVNRT ablation maintaining sinus rhythm


Follow up in 1 month











CELSO FRAGA Mar 1, 2019 12:47


ROSE WESTON MD Mar 1, 2019 20:41

## 2019-03-01 NOTE — NUR
SS following up with discharge planning. No discharge needs noted at this time. Pt is 
currently on room air. SS will continue to follow for pending discharge needs.

## 2019-03-01 NOTE — PDOC
PROGRESS NOTES


Chief Complaint


Chief Complaint


Angioedema


Hypertensive urgency


Thrush 


Recent cardiac ablation


Diabetes Mellitus type 2


History of dyslipidemia


Chronic renal insufficiency





History of Present Illness


History of Present Illness


Patient was seen and examined in her room this morning. Discussed with RN. 

Patient's angioedema has improved significantly. Most recent blood pressure 147/

75. Cardiology following. No acute complaints at this time. Probable discharge 

later today if ok with cardiology.





Vitals


Vitals





Vital Signs








  Date Time  Temp Pulse Resp B/P (MAP) Pulse Ox O2 Delivery O2 Flow Rate FiO2


 


3/1/19 08:45  75  147/75    


 


3/1/19 08:01 97.9  18  98 Room Air  





 97.9       











Physical Exam


General:  Alert, Oriented X3, Cooperative, No acute distress


Heart:  Regular rate (SR), Normal S1, Normal S2


Lungs:  Clear, Other (no rhonchi)


Abdomen:  Soft, No tenderness


Extremities:  No cyanosis, Other (trace LE edema)


Skin:  No breakdown, No significant lesion





Labs


LABS





Laboratory Tests








Test


 2/28/19


11:40 2/28/19


16:42 3/1/19


08:36


 


Glucose (Fingerstick)


 169 mg/dL


(70-99) 155 mg/dL


(70-99) 84 mg/dL


(70-99)











Review of Systems


Review of Systems


Complains of weakness and angioedema.


Denies chest pain or headache.





Assessment and Plan


Assessmemt and Plan


Assessment:


Angioedema


Hypertensive urgency


Thrush 


Recent cardiac ablation


Diabetes Mellitus type 2


History of dyslipidemia


Chronic renal insufficiency





Plan:


1. Cardiac monitoring


2. Prednisone


3. Diflucan


4. Monitor labs


5. Home meds


6. PT/OT


7. Appreciate subspecialty input


8. Possible discharge today if ok with cardiology





Comment


Review of Relevant


I have reviewed the following items bruce (where applicable) has been applied.


Labs





Laboratory Tests








Test


 2/27/19


11:59 2/27/19


17:51 2/28/19


07:05 2/28/19


10:55


 


Glucose (Fingerstick)


 182 mg/dL


(70-99) 149 mg/dL


(70-99) 142 mg/dL


(70-99) 





 


Erythrocyte Sedimentation Rate    30 (0-25) 


 


Test


 2/28/19


11:40 2/28/19


16:42 3/1/19


08:36 





 


Glucose (Fingerstick)


 169 mg/dL


(70-99) 155 mg/dL


(70-99) 84 mg/dL


(70-99) 











Laboratory Tests








Test


 2/28/19


11:40 2/28/19


16:42 3/1/19


08:36


 


Glucose (Fingerstick)


 169 mg/dL


(70-99) 155 mg/dL


(70-99) 84 mg/dL


(70-99)








Medications





Current Medications


Methylprednisolone Sodium Succinate (SOLU-Medrol 125MG VIAL) 125 mg 1X  ONCE IV

  Last administered on 2/26/19at 21:23;  Start 2/26/19 at 20:45;  Stop 2/26/19 

at 20:46;  Status DC


Diphenhydramine HCl (Benadryl) 25 mg 1X  ONCE IV  Last administered on 2/26/ 19at 21:23;  Start 2/26/19 at 20:45;  Stop 2/26/19 at 20:46;  Status DC


Famotidine (Pepcid Vial) 20 mg 1X  ONCE IVP  Last administered on 2/26/19at 21:

23;  Start 2/26/19 at 20:45;  Stop 2/26/19 at 20:46;  Status DC


Sodium Chloride 1,000 ml @  100 mls/hr Q10H IV  Last administered on 2/26/19at 

21:22;  Start 2/26/19 at 20:32;  Stop 2/27/19 at 06:31;  Status DC


Clonidine HCl (Catapres) 0.2 mg 1X  ONCE PO  Last administered on 2/26/19at 22:

28;  Start 2/26/19 at 22:15;  Stop 2/26/19 at 22:16;  Status DC


Hydralazine HCl (Apresoline Inj) 10 mg PRN Q4HRS  PRN IVP SBP>190 Last 

administered on 2/27/19at 01:16;  Start 2/26/19 at 22:45


Amlodipine Besylate (Norvasc) 10 mg DAILY PO  Last administered on 3/1/19at 08:

45;  Start 2/27/19 at 09:00


Aspirin (Ecotrin) 81 mg DAILY PO  Last administered on 3/1/19at 08:52;  Start 2/ 27/19 at 09:00


Clonidine HCl (Catapres) 0.1 mg DAILY PO  Last administered on 2/28/19at 08:45;

  Start 2/27/19 at 09:00;  Stop 2/28/19 at 10:28;  Status DC


Clopidogrel Bisulfate (Plavix) 75 mg DAILY PO  Last administered on 3/1/19at 08:

40;  Start 2/27/19 at 09:00


Metoprolol Tartrate (Lopressor) 25 mg BID PO  Last administered on 3/1/19at 08:

42;  Start 2/27/19 at 09:00


Potassium Chloride (Klor-Con) 10 meq BIDWMEALS PO  Last administered on 3/1/

19at 08:52;  Start 2/27/19 at 08:00


Ranolazine (Ranexa) 500 mg BID PO  Last administered on 3/1/19at 08:44;  Start 2 /27/19 at 09:00


Tramadol HCl (Ultram) 50 mg PRN Q6HRS  PRN PO PAIN Last administered on 2/27/ 19at 18:32;  Start 2/27/19 at 07:45


Fenofibrate (Lofibra) 54 mg DAILY PO  Last administered on 3/1/19at 08:39;  

Start 2/27/19 at 09:00


Glimepiride (Amaryl) 0.5 mg BID PO  Last administered on 2/27/19at 08:08;  

Start 2/27/19 at 09:00;  Stop 2/27/19 at 18:35;  Status DC


Isosorbide Mononitrate (Imdur) 60 mg DAILY PO  Last administered on 3/1/19at 08:

44;  Start 2/27/19 at 09:00


Atorvastatin Calcium (Lipitor) 20 mg QHS PO  Last administered on 2/28/19at 21:

38;  Start 2/27/19 at 21:00


Chlorthalidone (Thalitone) 25 mg DAILY PO  Last administered on 3/1/19at 08:40;

  Start 2/27/19 at 11:00


Fluconazole (Diflucan) 100 mg DAILY PO  Last administered on 3/1/19at 08:38;  

Start 2/27/19 at 10:45


Glimepiride (Amaryl) 1 mg BIDWMEALS PO  Last administered on 3/1/19at 08:39;  

Start 2/28/19 at 08:00


Prednisone (Prednisone) 40 mg DAILY PO  Last administered on 3/1/19at 08:40;  

Start 2/27/19 at 21:00


Cetirizine HCl (ZyrTEC) 10 mg DAILY PO  Last administered on 3/1/19at 08:41;  

Start 2/28/19 at 11:00


Clonidine HCl (Catapres Tts-1) 1 patch WEEKLY TD  Last administered on 2/28/ 19at 11:32;  Start 2/28/19 at 11:00





Active Scripts


Active


Metoprolol Tartrate 25 Mg Tablet 25 Mg PO BID


     SIG: one p.o. BID


Reported


Tramadol Hcl 50 Mg Tablet 50 Mg PO Q6HRS PRN


Amlodipine Besylate 10 Mg Tablet 10 Mg PO DAILY


Clopidogrel (Clopidogrel Bisulfate) 75 Mg Tablet 1 Tab PO DAILY


Ranexa (Ranolazine) 500 Mg Tab.er.12h 1 Tab PO BID


Potassium Chloride 10 Meq Tablet.er 10 Meq PO BID


Isosorbide Mononitrate Er (Isosorbide Mononitrate) 60 Mg Tab.er.24h 1 Tab PO 

DAILY


Benicar Hct 40-25 Mg Tablet (Olmesartan/Hydrochlorothiazide) 1 Each Tablet 1 

Tab PO DAILY


Tricor (Fenofibrate Nanocrystallized) 48 Mg Tablet 48 Mg PO DAILY


Glimepiride 1 Mg Tablet 0.5 Tab PO BID


Crestor (Rosuvastatin Calcium) 5 Mg Tablet 1 Tab PO DAILY


Clonidine Hcl 0.1 Mg Tablet 1 Tab PO DAILY


Aspir 81 (Aspirin) 81 Mg Tablet. 1 Tab PO DAILY


Vitals/I & O





Vital Sign - Last 24 Hours








 2/28/19 2/28/19 2/28/19 2/28/19





 15:11 19:55 20:00 21:38


 


Temp 97.7 97.8  





 97.7 97.8  


 


Pulse 65 70  70


 


Resp 17 17  


 


B/P (MAP) 151/64 (93) 141/58 (85)  141/58


 


Pulse Ox 95 96  


 


O2 Delivery Room Air Room Air Room Air 


 


    





    





 2/28/19 2/28/19 3/1/19 3/1/19





 21:39 23:35 03:51 03:51


 


Temp  98.1 97.9 





  98.1 97.9 


 


Pulse 70 63  69


 


Resp  17 17 


 


B/P (MAP) 141/58 147/60 (89) 127/74 (91) 180/70 (106)


 


Pulse Ox  97 97 


 


O2 Delivery  Room Air Room Air 


 


    





    





 3/1/19 3/1/19 3/1/19 3/1/19





 07:00 08:00 08:01 08:42


 


Temp 97.9  97.9 





 97.9  97.9 


 


Pulse 60  60 74


 


Resp 18  18 


 


B/P (MAP) 147/75 (99)  147/75 (99) 147/75


 


Pulse Ox 98  98 


 


O2 Delivery Room Air Room Air Room Air 


 


    





    





 3/1/19 3/1/19 3/1/19 





 08:44 08:44 08:45 


 


Pulse 71 70 75 


 


B/P (MAP) 147/75 147/75 147/75 














Intake and Output   


 


 2/28/19 2/28/19 3/1/19





 14:59 22:59 06:59


 


Intake Total 600 ml 900 ml 500 ml


 


Output Total 600 ml  1900 ml


 


Balance 0 ml 900 ml -1400 ml

















MELISSA CARCAMO III DO Mar 1, 2019 11:34

## 2019-03-01 NOTE — NUR
PT LEFT WITH FAMILYT THIS SHIFT AND ASKED THAT CELSO CALL IN HER CLONIDINE PATCH 
PERSCRIPTION TO THE [facility]. PT ENCOURAGED TO CONTINUE ZYRTEC AT HOME DAILY FOR A 
COUPLE WEEKS. PT GIVEN HAND SCRIPT FOR MEDROL DOSE PACK PER DR CARCAMO.

-------------------------------------------------------------------------------

Addendum: 03/01/19 at 1824 by CHIKI DEVINE RN

-------------------------------------------------------------------------------

SPOKE WITH THE PT THIS EVENING IN REGARDS TO STOPPING HER BENICAR(OLMESARTAN), CLONIDINE PO, 
AND CONTINUING TO TAKE ZYRTEC FOR A COUPLE OF WEEKS. PT ALSO REMINDED TO FILL MEDROL DOSE 
PACK AS HAND SCRIPT WAS GIVEN AND THIS NURSE CALLED IN CLONIDINE PATCHES AND CHLORTHALIDONE 
TO [facility] AND SPOKE DIRECTLY TO PHARMACIST IN REGARDS TO NEW MEDS 
AND WHAT TO STOP AND CONTINUE AS WELL AS IN LENGTH EDUCATION AGAIN WITH THE PT. PT 
ENCOURAGED TO CALL IF THERE ARE ANY QUESTIONS.

## 2019-03-01 NOTE — DS
DATE OF DISCHARGE:  03/01/2019



ADMISSION DIAGNOSIS:  Angioedema.



DISCHARGE DIAGNOSIS:  Resolving angioedema secondary to angiotensin-converting

enzyme inhibitors.



CONSULTS:  Cardiology.



PROCEDURES:  None.



HOSPITAL COURSE:  The patient is a pleasant middle-aged female who presented

with angioedema, probably secondary to ACE inhibitor.  She was admitted, started

on steroids.  Cardiology was consulted.  We held the lisinopril.  Over the past

few days, she has improved.  Her symptoms have resolved.  We plan to discharge

on a steroid taper.



DISPOSITION:  Home.



ACTIVITY:  As tolerated.



DIET:  Low sodium.



MEDICATIONS:  Please see MRAD.



TOTAL TIME:  32 minutes.

 



______________________________

MELISSA CARCAMO DO



DR:  CHERELLE/ren  JOB#:  [id number] / [id number]

DD:  03/01/2019 11:34  DT:  03/01/2019 19:19

## 2019-10-14 ENCOUNTER — HOSPITAL ENCOUNTER (OUTPATIENT)
Dept: HOSPITAL 61 - MAMMO | Age: 79
Discharge: HOME | End: 2019-10-14
Attending: OBSTETRICS & GYNECOLOGY
Payer: MEDICARE

## 2019-10-14 DIAGNOSIS — Z12.31: Primary | ICD-10-CM

## 2019-10-14 PROCEDURE — 77067 SCR MAMMO BI INCL CAD: CPT

## 2019-10-14 PROCEDURE — 77063 BREAST TOMOSYNTHESIS BI: CPT

## 2019-10-15 NOTE — RAD
DATE: 10/14/2019



EXAM: MAMMO RACHEL SCREENING BILATERAL



HISTORY: Routine screening



COMPARISON: 10/7/2016, 10/9/2017, 10/10/2018 mammographic exams



This study was interpreted with the benefit of Computerized Aided Detection

(CAD).





Breast Density: SCATTERED The breast parenchyma shows scattered fibroglandular

densities. Breast parenchyma level B.





FINDINGS: Loop recorder device overlies the left inner breast region and

obscures evaluation of this site. No suspicious calcifications, distortion, or

masses.





IMPRESSION: Stable







BI-RADS CATEGORY: 1 NEGATIVE



RECOMMENDED FOLLOW-UP: 12M 12 MONTH FOLLOW-UP



PQRS compliance statement: Patient information was entered into a reminder

system with a target due date for the next mammogram.



Mammography is a sensitive method for finding small breast cancers, but it

does not detect them all and is not a substitute for careful clinical

examination.  A negative mammogram does not negate a clinically suspicious

finding and should not result in delay in biopsying a clinically suspicious

abnormality.



"Our facility is accredited by the American [employer] Mammography

Program."

## 2020-02-06 ENCOUNTER — HOSPITAL ENCOUNTER (OUTPATIENT)
Dept: HOSPITAL 61 - ECHO | Age: 80
Discharge: HOME | End: 2020-02-06
Attending: INTERNAL MEDICINE
Payer: MEDICARE

## 2020-02-06 DIAGNOSIS — I25.10: ICD-10-CM

## 2020-02-06 DIAGNOSIS — I08.0: Primary | ICD-10-CM

## 2020-02-06 PROCEDURE — 93306 TTE W/DOPPLER COMPLETE: CPT

## 2020-02-06 NOTE — CARD
MR#: [id number]

Account#: [id number]

Accession#: [id number]

Date of Study: 02/06/2020

Ordering Physician: ROSE WESTON, 

Referring Physician: ROSE WESTON, 

Tech: Patrick Rockwell RDCS





--------------- APPROVED REPORT --------------





EXAM: Two-dimensional and M-mode echocardiogram with Doppler and color Doppler.



Other Information 

Quality : AverageHR: 58bpm



INDICATION

CAD



RISK FACTORS

Hypertension 

Hyperlipidemia

Diabetes

Hx smoking, quit 40 years ago



2D DIMENSIONS 

RVDd3.1 (2.9-3.5cm)Left Atrium(2D)3.2 (1.6-4.0cm)

IVSd1.3 (0.7-1.1cm)Aortic Root(2D)2.8 (2.0-3.7cm)

LVDd3.3 (3.9-5.9cm)LVOT Diameter1.6 (1.8-2.4cm)

PWd1.3 (0.7-1.1cm)LVDs1.8 (2.5-4.0cm)

FS (%) 45.7 %SV33.9 ml



Aortic Valve

AoV Peak Darshan.155.6cm/Ann Peak GR.10.2mmHg

LVOT Peak Darshan.125.0cm/sAVA (VMAX)1.63cm2

AI P 1/2 [id number]



Mitral Valve

MV E Welvckyo25.5cm/sMV DECEL [id number]

MV A Ysbupgog874.3cm/sE/A  Ratio0.6

MV A Vipclgmp897di



Pulmonary Valve

PV Peak Gdhfhgsl10.9cm/s



Tricuspid Valve

TR P. Ccgbuine347qa/sRAP BLWSPUHC2uaHl

TR Peak Gr.20jySuHSAL15qbEy



Pulmonary Vein

S1 Qdwuoana37.7cm/sD2 Gclggmua30.5cm/s

PVa kgxolimf150gdlg



 LEFT VENTRICLE 

The left ventricle is normal size. There is mild concentric left ventricular hypertrophy. The left ve
ntricular systolic function is normal and the ejection fraction is within normal range. The Ejection 
Fraction is 60-65%. There is normal LV segmental wall motion. Transmitral Doppler flow pattern is Gra
de I-abnormal relaxation pattern. There is no ventricular septal defect visualized.



 RIGHT VENTRICLE 

The right ventricle is normal size. The right ventricular systolic function is normal.



 ATRIA 

The left atrium size is upper limits of normal. The right atrium size is normal. The interatrial sept
um is intact with no evidence for an atrial septal defect or patent foramen ovale as noted on 2-D or 
Doppler imaging.



 AORTIC VALVE 

The aortic valve is mildly calcified. Doppler and Color Flow revealed trace to mild aortic regurgitat
ion. There is no significant aortic valvular stenosis.



 MITRAL VALVE 

Moderate mitral annular calcification. There is no evidence of mitral valve prolapse. There is no trena
ral valve stenosis. Doppler and Color Flow revealed no mitral valve regurgitation noted.



 TRICUSPID VALVE 

The tricuspid valve is normal in structure and function. Doppler and Color Flow revealed trace tricus
pid regurgitation. PAP is estimated at 32 mmHg. There is no tricuspid valve stenosis.



 PULMONIC VALVE 

The pulmonic valve is not well visualized. Doppler and Color Flow revealed trace pulmonic valvular re
gurgitation. There is no pulmonic valvular stenosis.



 GREAT VESSELS 

The aortic root is normal in size. The ascending aorta is normal in size. The IVC is normal in size a
nd collapses >50% with inspiration.



 PERICARDIAL EFFUSION 

There is no pleural effusion. There is no evidence of significant pericardial effusion.



Critical Notification

Critical Value: No



<Conclusion>

The left ventricle is normal size.

The left ventricular systolic function is normal and the ejection fraction is within normal range.

The Ejection Fraction is 60-65%.

There is mild concentric left ventricular hypertrophy.

Doppler and Color Flow revealed trace to mild aortic regurgitation.

There is no significant aortic valvular stenosis.

Doppler and Color Flow revealed no mitral valve regurgitation noted.

Doppler and Color Flow revealed trace tricuspid regurgitation. PAP is estimated at 32 mmHg.



Signed by : Pradeep Diaz MD

Electronically Approved : 02/06/2020 11:51:35

## 2020-08-05 ENCOUNTER — HOSPITAL ENCOUNTER (OUTPATIENT)
Dept: HOSPITAL 61 - NM | Age: 80
Discharge: HOME | End: 2020-08-05
Attending: INTERNAL MEDICINE
Payer: MEDICARE

## 2020-08-05 DIAGNOSIS — E11.9: ICD-10-CM

## 2020-08-05 DIAGNOSIS — I25.10: Primary | ICD-10-CM

## 2020-08-05 DIAGNOSIS — I10: ICD-10-CM

## 2020-08-05 PROCEDURE — 78452 HT MUSCLE IMAGE SPECT MULT: CPT

## 2020-08-05 PROCEDURE — A9500 TC99M SESTAMIBI: HCPCS

## 2020-08-05 PROCEDURE — 93017 CV STRESS TEST TRACING ONLY: CPT

## 2020-08-05 NOTE — RAD
MR#: [id number]

Account#: [id number]

Accession#: [id number]

Date of Study: 08/05/2020

Ordering Physician: ROSE WESTON 

Referring Physician: SURAJ TORERS Tech: RT HUNG Perez) (N)





--------------- APPROVED REPORT --------------





Test Type:          Pharmacological

Stress Nurse/Tech: Alcira Gabriel R.N.

Test Indications: CAD

Cardiac History: CAD, htn,DM

Medications:     See Electronic Medical Record

Medical History: See Electronic Medical Record

Resting ECG:     SR W/ very slight ST elevation in lead AVR, V1

Resting Heart Rate: 70 bpm

Resting Blood Pressure: 193/78mmHg

Pretest Chest Pain: No chest pain



Nurse/Tech Notes

S1S2, lungs CTA. Note: pt stated that she took all her meds this am ,including her metoprolol, she st
ates that her sb/p runs around 140 usually.

Consent: The procedure was explained to the patient in lay terms. Informed consent was witnessed. Saji campbell was [facility] into [facility] History and Stress Test performed by RT HUNG Perez) (N)



Pharm. Details

Pharmacologic stress testing was performed using 0.4mg per 5ml of regadenoson given intravenously ove
r 7-10 seconds.



Stress Symptoms

SOA, "full feeling" in arms and legs



POST EXERCISE

Reason for Termination: Infusion complete

Max HR: 87 bpm

Max Blood Pressure: 181/76mmHg

Blood Pressure response to exercise: Normal blood pressure response during stress.

Heart Rate response to exercise: wnl

Chest Pain: No. 

Arrhythmia: No. 

ST Change: Yes. non diagnostic ST depressions



INTERPRETATION

Stress EKG Conclusion: Baseline EKG showed sinus rhythm.  Non-diagnostic changes at peak stress.  No 
arrhythmias.



Imaging Protocol

IMAGE PROTOCOL: Rest Tc-99m/stress Tc-99m 1 day



Rest:            Stress:         Viability:   

Radiopharm.Tc99m VardthdozPl13h Sestamibi

Ebrq76oEw            32.1mCi            

Duration    13min.           13min.           

Img Date  08/05/2020 08/05/2020      

Inj-Img Hpmh10suj.           60min.           



Rest Admin Site:IV - Left AntecubitalAdministrator:RT HUNG Perez)(N)

Stress Admin Site: IV - Left AntecubitalAdministrator: Andria Forrest, RT (R)(N)



STRESS DATA

End Diast. Vol.66.0mlLVEDV index BSA35.0ml

End Syst. Vol.15.0mlLVESV index BSA8.0ml

Myocardial Yiav287.0gEject. Wtomngmy67.0%



Stress Scores

Regional WT2.00Summed WT12.00

Regional WM0.00Summed WM0.00



Study quality was good.  Left Ventricular size was Normal at Rest and Stress.

Lung uptake was .  Left Ventricular ejection fraction is 77%.

The rest and stress images show normal perfusion, normal contraction and thickening.



LV Perf. Quant

17 Seg. SSS2.00

17 Seg. SRS1.00

17 Seg. SDS2.00

Stress Defect Extent (% LAD)0.00Rest Defect Extent (% LAD)2.50Rev. Defect Extent (% LAD)0.00

Stress Defect Extent (% LCX) 15.00Rest Defect Extent (% LCX)0.00Rev. Defect Extent (% LCX)0.00

Stress Defect Extent (% RCA)0.00Rest Defect Extent (% RCA)0.00Rev. Defect Extent (% RCA)0.00

Stress Defect Extent (% EB)2.60Rest Defect Extent (% BE)0.90Rev. Defect Extent (% BE)0.00



Conclusion

1. Regadenoson cardioisotope stress test did not show any evidence of ischemia or infarct.

2. Normal left ventricular systolic function with ejection fraction calculated at 77%.

3. Low risk for cardiac events.



Signed by : Rose Weston, 

Electronically Approved : 08/05/2020 13:09:05

## 2020-10-15 ENCOUNTER — HOSPITAL ENCOUNTER (OUTPATIENT)
Dept: HOSPITAL 61 - MAMMO | Age: 80
End: 2020-10-15
Attending: FAMILY MEDICINE
Payer: MEDICARE

## 2020-10-15 DIAGNOSIS — Z12.31: Primary | ICD-10-CM

## 2020-10-15 PROCEDURE — 77067 SCR MAMMO BI INCL CAD: CPT

## 2020-10-15 PROCEDURE — 77063 BREAST TOMOSYNTHESIS BI: CPT

## 2020-10-16 NOTE — RAD
DATE: 10/15/2020 8:58 AM



EXAM: MAMMO RACHEL SCREENING BILATERAL



HISTORY:  Screening



COMPARISON: 10/14/2019



Bilateral CC and MLO views of the breasts were performed. Bilateral breast

tomosynthesis was performed in CC and MLO projections.



This study was interpreted with the benefit of Computerized Aided Detection

(CAD).





FINDINGS:



Breast Density: FATTY  The Breast Parenchyma is primarily fatty replaced.

Breast parenchyma level density A.



Event monitor in the medial left breast is redemonstrated.

No suspicious masses, microcalcifications or architectural distortion is

present to suggest malignancy in either breast.





The visualized axillae are unremarkable. 



IMPRESSION: No mammographic evidence of malignancy. 



BI-RADS CATEGORY: 1 NEGATIVE



RECOMMENDED FOLLOW-UP: 12M 12 MONTH FOLLOW-UP Annual screening mammography is

recommended, unless clinically indicated sooner based on symptoms or change in

physical exam.



PQRS compliance statement: Patient information was entered into a reminder

system with a target due date for the next mammogram.



Mammography is a sensitive method for finding small breast cancers, but it

does not detect them all and is not a substitute for careful clinical

examination.  A negative mammogram does not negate a clinically suspicious

finding and should not result in delay in biopsying a clinically suspicious

abnormality.



"Our facility is accredited by the American College [employer] Mammography

Program."

## 2021-01-26 ENCOUNTER — HOSPITAL ENCOUNTER (EMERGENCY)
Dept: HOSPITAL 61 - ER | Age: 81
Discharge: HOME | End: 2021-01-26
Payer: MEDICARE

## 2021-01-26 VITALS
SYSTOLIC BLOOD PRESSURE: 147 MMHG | DIASTOLIC BLOOD PRESSURE: 67 MMHG | DIASTOLIC BLOOD PRESSURE: 67 MMHG | DIASTOLIC BLOOD PRESSURE: 67 MMHG | DIASTOLIC BLOOD PRESSURE: 67 MMHG | DIASTOLIC BLOOD PRESSURE: 67 MMHG | SYSTOLIC BLOOD PRESSURE: 147 MMHG | DIASTOLIC BLOOD PRESSURE: 67 MMHG | SYSTOLIC BLOOD PRESSURE: 147 MMHG | SYSTOLIC BLOOD PRESSURE: 147 MMHG

## 2021-01-26 VITALS — BODY MASS INDEX: 32.42 KG/M2 | HEIGHT: 63 IN | WEIGHT: 182.98 LBS

## 2021-01-26 DIAGNOSIS — Z88.0: ICD-10-CM

## 2021-01-26 DIAGNOSIS — G56.11: Primary | ICD-10-CM

## 2021-01-26 DIAGNOSIS — E78.00: ICD-10-CM

## 2021-01-26 DIAGNOSIS — E11.9: ICD-10-CM

## 2021-01-26 DIAGNOSIS — I10: ICD-10-CM

## 2021-01-26 DIAGNOSIS — Z87.891: ICD-10-CM

## 2021-01-26 DIAGNOSIS — Z88.1: ICD-10-CM

## 2021-01-26 DIAGNOSIS — Z88.8: ICD-10-CM

## 2021-01-26 PROCEDURE — 99283 EMERGENCY DEPT VISIT LOW MDM: CPT

## 2021-01-26 PROCEDURE — 29125 APPL SHORT ARM SPLINT STATIC: CPT

## 2021-01-26 NOTE — PHYS DOC
Past Medical History


Past Medical History:  Diabetes-Type II, High Cholesterol, Hypertension


Additional Past Medical Histor:  LOW POTASSIUM,"ventricular tachycardia" w 

ablation, 55% occuluded L carotid


Past Surgical History:  Appendectomy, Cholecystectomy, Hysterectomy


Additional Past Surgical Histo:  BACK SURGERY, CARDIAC CATH, vascular surgery of

the aorta


Smoking Status:  Former Smoker


Alcohol Use:  None


Drug Use:  None





Adult General


Chief Complaint


Chief Complaint:  UPPER EXTREMITY PAIN





[facility]


HPI





Patient is a 80  year old female presenting the emergency department complaint 

of new onset of right upper extremity pain.  Patient states that approximately 

30 minutes prior to arrival she was reaching to try and turn the doorknob when 

she felt an electrical and tingling sensation in digits 1 through 3 and had 

difficulty opening a doorknob.  Patient also noted some weakness in her fingers 

during that time.  Since the patient is arrived she has her complete resolution 

of her symptoms.  Notes that she has been working on her taxes this week and has

been doing a lot of writing.  Denies any fall, injury to the area, neck pain, 

headache





Review of Systems


Review of Systems





Constitutional: Denies fever or chills []


Eyes: Denies change in visual acuity, redness, or eye pain []


HENT: Denies nasal congestion or sore throat []


Respiratory: Denies cough or shortness of breath []


Cardiovascular: No additional information not addressed in HPI []


GI: Denies abdominal pain, nausea, vomiting, bloody stools or diarrhea []


:  Denies dysuria or hematuria []


Musculoskeletal: Denies back pain or joint pain []


Integument: Denies rash or skin lesions []


Neurologic: Denies headache, focal weakness or sensory changes []


Endocrine: Denies polyuria or polydipsia []





All other systems were reviewed and found to be within normal limits, except as 

documented in this note.





Allergies


Allergies





Allergies








Coded Allergies Type Severity Reaction Last Updated Verified


 


  lisinopril Allergy Severe Swelling 2/2/16 Yes


 


  Penicillins Allergy Intermediate Hives 2/2/16 Yes


 


  azithromycin Allergy Intermediate Nausea and Vomiting 2/2/16 Yes


 


  cefaclor Allergy Intermediate Nausea and Vomiting 2/3/16 Yes











Physical Exam


Physical Exam





Constitutional: Well developed, well nourished, no acute distress, non-toxic 

appearance. []


HENT: Normocephalic, atraumatic, bilateral external ears normal, oropharynx 

moist, no oral exudates, nose normal. []


Eyes: PERRLA, EOMI, conjunctiva normal, no discharge. [] 


Neck: Normal range of motion, no tenderness, supple, no stridor. [] 


Cardiovascular:Heart rate regular rhythm, no murmur []


Lungs & Thorax:  Bilateral breath sounds clear to auscultation []


Abdomen: Bowel sounds normal, soft, no tenderness, no masses, no pulsatile 

masses. [] 


Skin: Warm, dry, no erythema, no rash. [] 


Back: No tenderness, no CVA tenderness. [] 


Extremities: No tenderness, no cyanosis, no clubbing, ROM intact, no edema. [] 


Neurologic: Alert and oriented X 3, normal motor function, normal sensory 

function, no focal deficits noted. []


Psychologic: Affect normal, judgement normal, mood normal. []





Current Patient Data


Vital Signs





                                   Vital Signs








  Date Time  Temp Pulse Resp B/P (MAP) Pulse Ox O2 Delivery O2 Flow Rate FiO2


 


1/26/21 22:10 98.2 87 20 166/77 (106) 97 Room Air  





 98.2       











EKG


EKG


[]





Radiology/Procedures


Radiology/Procedures


[]





Course & Med Decision Making


Course & Med Decision Making


Pertinent Labs and Imaging studies reviewed. (See chart for details)





80f with right upper extremity pain most consistent with an acute median nerve 

neuropraxia.  No significant findings on exam at this time.  No neurologic 

deficit.  Will discharge the patient home with a splint and PCP follow-up.





Dragon Disclaimer


Dragon Disclaimer


This electronic medical record was generated, in whole or in part, using a voice

 recognition dictation system.





Departure


Departure


Impression:  


   Primary Impression:  


   Right median nerve neuropathy


Disposition:  01 DC HOME SELF CARE/HOMELESS


Condition:  GOOD


Referrals:  


ASHIA RAMÍREZ MD (PCP)


Patient Instructions:  Pain, Neuropathic





Additional Instructions:  


EMERGENCY DEPARTMENT GENERAL DISCHARGE INSTRUCTIONS





Thank you for coming to [facility] Emergency Department (ED) 

today and 


trusting us with you care.  We trust that you had a positive experience in our 

Emergency 


Department.  If you wish to speak to the department management, you may call the

 Director at 


[phone].





YOUR FOLLOW UP INSTRUCTIONS ARE AS FOLLOWS:





1.  Do you have a private Doctor?  If you do not have a private doctor, please 

ask for a 


resource list of physicians or clinics that may be able to assist you with 

follow up care.





2.  The Emergency Physicain has interpreted your x-rays.  The X-Ray specialist 

will also 


review them.  If there is a change in the findings, you will be notified in 48 

hours when at 


all possible.





3.  A lab test or culture has been done, your results will be reviewed and you 

will be 


notified if you need a change in treatment.





ADDITIONAL INSTRUCTIONS AND INFORMATION:





1.  Your care today has been supervised by a physician who is specially trained 

in emergency 


care.  Many problems require more than one evaluation for a complete diagnosis 

and 


treatment.  We recommend that you schedule your follow up appointment as 

recommended to 


ensure complete treatment of you illness or injury.  If you are unable to obtain

 follow up 


care and continue to have a problem, or if your condition worsens, we recommend 

that you 


return to the ED.





2.  We are not able to safely determine your condition over the phone nor are we

 able to 


give sound medical advice over the phone.  For these safety reasons, if you call

 for medical 


advice we will ask you to come to the ED for further evaluation.





3.  If you have any questions regarding these discharge instructions please call

 the ED at 


[phone].





SAFETY INFORMATION:





In the interest of safety, wellness, and injury prevention; we encourage you to 

wear your 


sealbelt, if you smoke; quite smoking, and we encourage family to use a 

protective helmet 


for bicycling and other sporting events that present an increased risk for head 

injury.





IF YOUR SYMPTOMS WORSEN OR NEW SYMPTOMS DEVELOP, OR YOU HAVE CONCERNS ABOUT YOUR

 CONDITION; 


OR IF YOUR CONDITION WORSENS WHILE YOU ARE WAITING FOR YOUR FOLLOW UP 

APPOINTMENT; EITHER 


CONTACT YOUR PRIMARY CARE DOCTOR, THE PHYSICIAN WHOSE NAME AND NUMBER YOU WERE 

GIVEN, OR 


RETURN TO THE ED IMMEDIATELY.











SISSY ESPINAL MD              Jan 26, 2021 22:46

## 2021-02-03 ENCOUNTER — HOSPITAL ENCOUNTER (OUTPATIENT)
Dept: HOSPITAL 61 - US | Age: 81
End: 2021-02-03
Attending: INTERNAL MEDICINE
Payer: MEDICARE

## 2021-02-03 DIAGNOSIS — N28.1: Primary | ICD-10-CM

## 2021-02-03 PROCEDURE — 76770 US EXAM ABDO BACK WALL COMP: CPT

## 2021-02-03 NOTE — RAD
INDICATION: Reason: RENAL CYSTS / Spl. Instructions:  / History: 



COMPARISON: None.



TECHNIQUE: Grayscale and color ultrasound images obtained of the bilateral kidneys and bladder.



FINDINGS: 



Right Kidney: 171 mm. No hydronephrosis.

Left Kidney: 121 mm. No hydronephrosis.

Multiple bilateral renal cysts measuring up to 135 mm on the right and 106 mm on the left.

Bladder: Decompressed therefore not well evaluated



IMPRESSION: 



*  Multiple large hypoechoic lesions of the bilateral kidneys which appear predominantly cystic. Give
n the large size and multiplicity portions are not seen well enough to visualize the entire lesion.



Electronically signed by: Teddy Mckeon MD (2/3/2021 6:28 PM) [id number]

## 2021-02-10 ENCOUNTER — HOSPITAL ENCOUNTER (OUTPATIENT)
Dept: HOSPITAL 61 - ECHO | Age: 81
End: 2021-02-10
Attending: INTERNAL MEDICINE
Payer: MEDICARE

## 2021-02-10 DIAGNOSIS — I08.8: Primary | ICD-10-CM

## 2021-02-10 DIAGNOSIS — I25.10: ICD-10-CM

## 2021-02-10 PROCEDURE — 93306 TTE W/DOPPLER COMPLETE: CPT

## 2021-02-10 NOTE — CARD
MR#: [id number]

Account#: [id number]

Accession#: [id number]

Date of Study: 02/10/2021

Ordering Physician: ROSE WESTON, 

Referring Physician: ROSE WESTON, 

Tech: An Vasquez RDCS





--------------- APPROVED REPORT --------------





EXAM: Two-dimensional and M-mode echocardiogram with Doppler and color Doppler.



Other Information 

Quality : Good



INDICATION

Cardiac Disease: CAD 



2D DIMENSIONS 

RVDd1.8 (2.9-3.5cm)Left Atrium(2D)3.9 (1.6-4.0cm)

IVSd1.3 (0.7-1.1cm)Aortic Root(2D)2.8 (2.0-3.7cm)

LVDd4.4 (3.9-5.9cm)LVOT Diameter2.1 (1.8-2.4cm)

PWd1.3 (0.7-1.1cm)LVDs2.5 (2.5-4.0cm)

FS (%) 30.0 %SV64.7 ml

LVEF(%)60.0 (>50%)



Aortic Valve

AoV Peak Darshan.148.6cm/sAoV VTI32.7cm

AO Peak GR.8.8mmHgLVOT Peak Darshan.110.2cm/s

AO Mean GR.5mmHgAVA (VMAX)2.46cm2

GATO   (VTI)3.92zh1IN P 1/2 Ahvv721ah



Mitral Valve

MV E Cwhkepqv09.1cm/sMV E Peak Gr.7mmHg

MV DECEL FCJG868xfRR A Iefjdyhx397.4cm/s

MV E Mean Gr.2mmHgE/A  Ratio0.6



Tricuspid Valve

TR P. Babnhodn956eb/sRAP WIZEZOZD7scPc

TR Peak Gr.38qqNhHESS75lbAl



Pulmonary Vein

S1 Mmybwjyc61.1cm/sD2 Atspbadr05.3cm/s



 LEFT VENTRICLE 

The left ventricle is normal size. There is mild concentric left ventricular hypertrophy. The left ve
ntricular systolic function is normal. The Ejection Fraction is 60-65%. There is normal LV segmental 
wall motion. Transmitral Doppler flow pattern is Grade I-abnormal relaxation pattern.



 RIGHT VENTRICLE 

The right ventricle is normal size. The right ventricular systolic function is normal.



 ATRIA 

The left atrium size is normal. The right atrium size is normal. The interatrial septum is intact wit
h no evidence for an atrial septal defect or patent foramen ovale as noted on 2-D or Doppler imaging.




 AORTIC VALVE 

The aortic valve is calcified but opens well. Doppler and Color Flow revealed mild aortic regurgitati
on. There is no significant aortic valvular stenosis.



 MITRAL VALVE 

The mitral valve is calcified but opens well. Mitral annular calcification is moderate. There is no e
vidence of mitral valve prolapse. Calculated mitral valve area is 2.2 cm2 with maximum pressure gradi
ent of 7 mmHg and mean pressure gradient of 2 mmHg. Doppler and Color Flow revealed no mitral valve r
egurgitation noted.



 TRICUSPID VALVE 

The tricuspid valve is normal in structure and function. Doppler and Color Flow revealed trace to mil
d tricuspid regurgitation. There is moderate pulmonary hypertension. The PA pressure was estimated at
 44 mmHg. There is no tricuspid valve stenosis.



 PULMONIC VALVE 

The pulmonic valve is not well visualized. Doppler and Color Flow revealed mild pulmonic valvular reg
urgitation. There is no pulmonic valvular stenosis.



 GREAT VESSELS 

The aortic root is normal in size. The ascending aorta is not well seen. The IVC is normal in size an
d collapses >50% with inspiration.



 PERICARDIAL EFFUSION 

There is no evidence of significant pericardial effusion.



Critical Notification

Critical Value: No



<Conclusion>

The left ventricular systolic function is normal.

The Ejection Fraction is 60-65%.

There is normal LV segmental wall motion.

Transmitral Doppler flow pattern is Grade I-abnormal relaxation pattern.

Mld aortic regurgitation.

Trace to mild tricuspid regurgitation.

The PA pressure was estimated at 44 mmHg.

There is no evidence of significant pericardial effusion.



Signed by : Rose Weston, 

Electronically Approved : 02/10/2021 16:32:28

## 2021-10-18 ENCOUNTER — HOSPITAL ENCOUNTER (OUTPATIENT)
Dept: HOSPITAL 61 - MAMMO | Age: 81
End: 2021-10-18
Attending: FAMILY MEDICINE
Payer: MEDICARE

## 2021-10-18 DIAGNOSIS — Z12.31: Primary | ICD-10-CM

## 2021-10-18 PROCEDURE — 77063 BREAST TOMOSYNTHESIS BI: CPT

## 2021-10-18 PROCEDURE — 77067 SCR MAMMO BI INCL CAD: CPT

## 2021-10-18 NOTE — RAD
Bilateral digital screening 2-D and 3-D (digital breast tomosynthesis) mammogram:



Reason for examination: Routine screening.



Comparison:  Mammograms from 10/15/2020 10/14/2019.



Interpretation was made with the benefit of CAD.



FINDINGS:

Breast density: Category B. There are scattered areas of fibroglandular density.



No suspicious breast mass, malignant appearing calcifications, or architectural distortion is seen. T
he cardiac event monitoring device obscures a small portion of the inner left breast. There is unchan
ged benign superficial lesion likely within or just deep to the skin in the left upper outer quadrant
.



IMPRESSION:

No evidence of malignancy.



Assessment: BI-RADS 1. Negative.



Recommendation: Routine screening mammograms.



The patient will receive a letter with the results in the mail. Patient information will be entered i
nto the mammography reminder system with a target recall date for the next mammogram. A reminder js
er will be generated.



Electronically signed by: Kristi Herrera MD (10/18/2021 3:55 PM) [id number]

## 2022-03-03 ENCOUNTER — HOSPITAL ENCOUNTER (OUTPATIENT)
Dept: HOSPITAL 61 - NM | Age: 82
End: 2022-03-03
Attending: INTERNAL MEDICINE
Payer: MEDICARE

## 2022-03-03 DIAGNOSIS — I25.10: ICD-10-CM

## 2022-03-03 DIAGNOSIS — R94.31: Primary | ICD-10-CM

## 2022-03-03 PROCEDURE — A9500 TC99M SESTAMIBI: HCPCS

## 2022-03-03 PROCEDURE — 78452 HT MUSCLE IMAGE SPECT MULT: CPT

## 2022-03-03 PROCEDURE — 93017 CV STRESS TEST TRACING ONLY: CPT

## 2022-03-04 NOTE — RAD
MR#: [id number]

Account#: [id number]

Accession#: [id number]

Date of Study: 03/03/2022

Ordering Physician: ROSE WESTON, 

Referring Physician: SURAJ TORRES Tech: ABRAHAM Szymanski





--------------- APPROVED REPORT --------------





Test Type:          Pharmacological

Stress Nurse/Tech: Yarelis Martinez RN

Test Indications: CAD

Cardiac History: Diabetes

Medications:     See Electronic Medical Record

Medical History: See Electronic Medical Record

Resting ECG:     SR with PVC's

Resting Heart Rate: 73 bpm

Resting Blood Pressure: 143/70mmHg

Pretest Chest Pain: No chest pain



Nurse/Tech Notes

S1,S2 and lungs clear to auscultation.

Consent: The procedure was explained to the patient in lay terms. Informed consent was witnessed. Saji
eout was entered into [employer]. History and Stress Test performed by ABRAHAM Szymanski



Pharm. Details

Pharmacologic stress testing was performed using 0.4mg per 5ml of regadenoson given intravenously ove
r 7-10 seconds.



Stress Symptoms

No chest pain or symptoms.



POST EXERCISE

Reason for Termination: Infusion complete

Max HR: 88 bpm

Max Blood Pressure: 168/66mmHg

Blood Pressure response to exercise: Normal blood pressure response during stress.

Heart Rate response to exercise: WNL

Chest Pain: No. 

Arrhythmia: Yes. PVC's



INTERPRETATION

Stress EKG Conclusion: The resting EKG shows a sinus rhythm, PVCs and mild ST-T wave changes.

The patient has mild further ST segment changes with infusion that are not diagnostic of ischemia.

Baseline abnormal EKG with nonspecific ST-T wave changes with infusion that are not diagnostic of isc
hemia.



Imaging Protocol

IMAGE PROTOCOL: Rest Tc-99m/stress Tc-99m 1 day



Rest:            Stress:         Viability:   

Radiopharm.Tc99m XyfsfunefPe44h Sestamibi

Dose10.5mCi            33mCi            

Duration    15min.           10min.           

Img Date  03/03/2022 03/03/2022      

Inj-Img [id number].           60min.           



Rest Admin Site:IV - Left AntecubitalAdministrator:RT Chris (R)(N)

Stress Admin Site: IV - Left AntecubitalAdministrator: Poncho Moon, RT (R)(N)



STRESS DATA

End Diast. Vol.76.0mlAv. Heart Rate72.0bpm

End Syst. Vol.15.0mlCO Index BSA0.0L/min

Myocardial Heut910.0gEject. Kfbruhne04.0%



Stress Rates

Pk. Fill Rate3.05EDV/secLVtime Pk. Fill 229.85msec

Pk. Empty Rate4.83ESV/secLVtime Pk. Jbjnu556.87msec

1/3 Pk. Fill0.99EDV/sec



Stress Scores

Regional WT3.00Summed WT11.00

Regional WM0.00Summed WM0.00



LV Perfusion

The stress images show no significant defects.

The rest images show no significant defects.

Nuclear imaging shows no reversible ischemia or infarct.



Wall Motion

LV systolic function is intact with an ejection fraction of greater than 75%.



LV Perf. Quant

17 Seg. SSS0.00

17 Seg. SRS0.00

17 Seg. SDS0.00

Stress Defect Extent (% LAD)0.00Rest Defect Extent (% LAD)0.00Rev. Defect Extent (% LAD)0.00

Stress Defect Extent (% LCX) 0.00Rest Defect Extent (% LCX)0.00Rev. Defect Extent (% LCX)0.00

Stress Defect Extent (% RCA)0.00Rest Defect Extent (% RCA)0.00Rev. Defect Extent (% RCA)0.00

Stress Defect Extent (% BE)0.00Rest Defect Extent (% BE)0.00Rev. Defect Extent (% BE)0.00



Conclusion

1. Abnormal baseline EKG but no EKG evidence of stress-induced ischemia.

2. Nuclear imaging shows no reversible ischemia or infarct.

3. Normal LV systolic function with an ejection fraction of greater than 75%.

4. Moderately low risk Lexiscan nuclear stress test.



Signed by : Pradeep Diaz MD

Electronically Approved : 03/04/2022 11:23:05

## 2022-03-09 ENCOUNTER — HOSPITAL ENCOUNTER (OUTPATIENT)
Dept: HOSPITAL 61 - ECHO | Age: 82
End: 2022-03-09
Attending: INTERNAL MEDICINE
Payer: MEDICARE

## 2022-03-09 DIAGNOSIS — I70.203: ICD-10-CM

## 2022-03-09 DIAGNOSIS — I08.0: Primary | ICD-10-CM

## 2022-03-09 DIAGNOSIS — I25.10: ICD-10-CM

## 2022-03-09 PROCEDURE — 93306 TTE W/DOPPLER COMPLETE: CPT

## 2022-03-09 PROCEDURE — C8929 TTE W OR WO FOL WCON,DOPPLER: HCPCS

## 2022-03-09 PROCEDURE — 93922 UPR/L XTREMITY ART 2 LEVELS: CPT

## 2022-03-09 PROCEDURE — 93925 LOWER EXTREMITY STUDY: CPT

## 2022-03-09 NOTE — RAD
MR#: [id number]

Account#: [id number]

Accession#: [id number]

Date of Study: 03/09/2022

Ordering Physician: ROSE WESTON, 

Referring Physician: ROSE WESTON, 

Tech: Samy Kay MBA, RDMS, RVT, RDCS, RTR





--------------- APPROVED REPORT --------------





Patient Location: OUT-PATIENT



Indications

PAD



VELOCITY AND DOPPLER WAVEFORM ANALYSIS

RIGHT cm/secWaveformSeverity LEFT cm/secWaveform Severity 

dCFA 233.0BiphasicdCFA 196.0Biphasic

Prof Fem Art. 131.0BiphasicProf Fem Art. 80.0Biphasic

Fem Art Prox. 158.0BiphasicFem Art Prox. 126.0Biphasic

Fem Art Mid. 145.0BiphasicFem Art Mid. 194.0Biphasic

Fem Art Dist. 181.0BiphasicFem Art Dist. 150.0Biphasic

Pop Art(Fossa) 77.0BiphasicPop Art(AK) 91.0Biphasic

PTA Prox. 109.0BiphasicPTA Prox. 89.0Biphasic

PTA Dist. 57.0BiphasicPTA Dist. 75.0Biphasic

Per Art Mid. 71.0BiphasicPer Art Mid. 46.0Biphasic

ANCA Prox. 64.0BiphasicATA Prox. 80.0Biphasic

DPA 64BiphasicDPA 71Biphasic



Findings

Grayscale images of the bilateral lower extremity arterial vessels demonstrates mild diffuse atherosc
lerosis.  Mild elevation in velocities is noted at the bilateral common femoral arteries although the
re is a biphasic waveform suggestive of less than 50% stenosis.



Bilateral SFA, popliteal arteries did not show any significant disease

There is bilateral three-vessel runoff without any high-grade stenosis noted



Critical Notification

Critical Value: No



<Conclusion>

1.  No significant bilateral lower extremity arterial disease



Signed by : Prakash Steen, 

Electronically Approved : 03/09/2022 12:10:53

## 2022-03-09 NOTE — RAD
MR#: [id number]

Account#: [id number]

Accession#: [id number]

Date of Study: 03/09/2022

Ordering Physician: ROSE WESTON, 

Referring Physician: ROSE WESTON, 

Tech: Samy Kay MBA, RDMS, RVT, RDCS, RTR





--------------- APPROVED REPORT --------------





Patient Location: OUT-PATIENT



Indications

PAD



Findings

Right arm 139, left arm 149



Right ankle 153, left ankle 163



Right MARY 1.0, left MARY 1.1



Critical Notification

Critical Value: No



<Conclusion>

1.  Normal bilateral MARY



Signed by : Prakash Steen, 

Electronically Approved : 03/09/2022 12:09:41

## 2022-03-10 NOTE — CARD
MR#: [id number]

Account#: [id number]

Accession#: [id number]

Date of Study: 03/09/2022

Ordering Physician: ROSE WESTON, 

Referring Physician: ROSE WESTON 

Tech: Caitlin Perez [facility]





--------------- APPROVED REPORT --------------





EXAM: Two-dimensional and M-mode echocardiogram with Doppler and color Doppler.



Other Information 

Quality : AverageHR: 57bpm

Rhythm : NSRTechnically limited study due to  body habitus.



INDICATION

Cardiac Disease: CAD 



RISK FACTORS

Hypertension 

Obesity   

Hyperlipidemia



2D DIMENSIONS 

RVDd3.0 (2.9-3.5cm)Left Atrium(2D)5.3 (1.6-4.0cm)

IVSd1.3 (0.7-1.1cm)Aortic Root(2D)3.2 (2.0-3.7cm)

LVDd3.8 (3.9-5.9cm)LVOT Diameter2.0 (1.8-2.4cm)

PWd1.4 (0.7-1.1cm)LVDs2.3 (2.5-4.0cm)

FS (%) 40.8 %SV45.9 ml

LVEF(%)72.3 (>50%)



Aortic Valve

AoV Peak Darshan.156.7cm/sAoV VTI36.6cm

AO Peak GR.9.8mmHgLVOT Peak Darshan.112.6cm/s

AO Mean GR.5mmHgAVA (VMAX)2.28cm2

AI P 1/2 [id number]



Mitral Valve

MV E [id number]/sMV DECEL [id number]

MV A Jcvamuqn815.3cm/sE/A  Ratio0.7



Pulmonary Valve

PV Peak Jhfpfoea550.4cm/s



Tricuspid Valve

TR P. Kkzrlfsu999mo/sTR Peak Gr.34mmHg



 LEFT VENTRICLE 

The left ventricle is normal size. There is mild concentric left ventricular hypertrophy. The left ve
ntricular systolic function is normal and the ejection fraction is within normal range. Estimated eje
ction fraction 65%. There is normal LV segmental wall motion. Transmitral Doppler flow pattern is Gra
de I-abnormal relaxation pattern.



 RIGHT VENTRICLE 

The right ventricle is normal size. There is normal right ventricular wall thickness. The right ventr
icular systolic function is normal.



 ATRIA 

The left atrium size is normal. The right atrium size is normal. The interatrial septum is intact wit
h no evidence for an atrial septal defect or patent foramen ovale as noted on 2-D or Doppler imaging.




 AORTIC VALVE 

The aortic valve is calcified and displays decreased opening. Doppler and Color Flow revealed mild ao
rtic regurgitation. There is no significant aortic valvular stenosis.



 MITRAL VALVE 

Mitral annular calcification is moderate to severe. There is no evidence of mitral valve prolapse. Th
ere is no mitral valve stenosis. Doppler and Color Flow revealed no mitral valve regurgitation noted.




 TRICUSPID VALVE 

The tricuspid valve is normal in structure and function. Doppler and Color Flow revealed no tricuspid
 valve regurgitation noted. There is no tricuspid valve stenosis. 



 PULMONIC VALVE 

Doppler and Color Flow revealed trace pulmonic valvular regurgitation. There is no pulmonic valvular 
stenosis.



 GREAT VESSELS 

The aortic root is normal in size. The ascending aorta is normal in size. The IVC is normal in size a
nd collapses >50% with inspiration.



 PERICARDIAL EFFUSION 

There is no evidence of significant pericardial effusion.



Critical Notification

Critical Value: No



<Conclusion>

The left ventricular systolic function is normal and the ejection fraction is within normal range.  E
stimated ejection fraction 65%.

There is normal LV segmental wall motion.

The aortic valve is calcified and displays decreased opening.  Doppler and Color Flow revealed mild a
ortic regurgitation.



Signed by : Prakash Steen, 

Electronically Approved : 03/10/2022 10:21:37